# Patient Record
Sex: FEMALE | Race: BLACK OR AFRICAN AMERICAN | NOT HISPANIC OR LATINO | URBAN - METROPOLITAN AREA
[De-identification: names, ages, dates, MRNs, and addresses within clinical notes are randomized per-mention and may not be internally consistent; named-entity substitution may affect disease eponyms.]

---

## 2019-08-11 ENCOUNTER — EMERGENCY (EMERGENCY)
Facility: HOSPITAL | Age: 29
LOS: 1 days | Discharge: HOME | End: 2019-08-11
Attending: EMERGENCY MEDICINE | Admitting: EMERGENCY MEDICINE
Payer: COMMERCIAL

## 2019-08-11 VITALS
SYSTOLIC BLOOD PRESSURE: 116 MMHG | RESPIRATION RATE: 15 BRPM | HEART RATE: 70 BPM | OXYGEN SATURATION: 100 % | DIASTOLIC BLOOD PRESSURE: 74 MMHG | TEMPERATURE: 99 F

## 2019-08-11 VITALS
OXYGEN SATURATION: 100 % | DIASTOLIC BLOOD PRESSURE: 85 MMHG | HEART RATE: 76 BPM | SYSTOLIC BLOOD PRESSURE: 108 MMHG | RESPIRATION RATE: 15 BRPM | TEMPERATURE: 98 F

## 2019-08-11 LAB
ALBUMIN SERPL ELPH-MCNC: 4.1 G/DL — SIGNIFICANT CHANGE UP (ref 3.3–5)
ALP SERPL-CCNC: 83 U/L — SIGNIFICANT CHANGE UP (ref 40–120)
ALT FLD-CCNC: 11 U/L — SIGNIFICANT CHANGE UP (ref 4–33)
ANION GAP SERPL CALC-SCNC: 11 MMO/L — SIGNIFICANT CHANGE UP (ref 7–14)
APPEARANCE UR: CLEAR — SIGNIFICANT CHANGE UP
AST SERPL-CCNC: 16 U/L — SIGNIFICANT CHANGE UP (ref 4–32)
BASE EXCESS BLDV CALC-SCNC: 0.7 MMOL/L — SIGNIFICANT CHANGE UP
BASOPHILS # BLD AUTO: 0.04 K/UL — SIGNIFICANT CHANGE UP (ref 0–0.2)
BASOPHILS NFR BLD AUTO: 0.4 % — SIGNIFICANT CHANGE UP (ref 0–2)
BILIRUB SERPL-MCNC: 0.4 MG/DL — SIGNIFICANT CHANGE UP (ref 0.2–1.2)
BILIRUB UR-MCNC: NEGATIVE — SIGNIFICANT CHANGE UP
BLOOD GAS VENOUS - CREATININE: 0.84 MG/DL — SIGNIFICANT CHANGE UP (ref 0.5–1.3)
BLOOD GAS VENOUS - FIO2: 21 — SIGNIFICANT CHANGE UP
BLOOD UR QL VISUAL: SIGNIFICANT CHANGE UP
BUN SERPL-MCNC: 13 MG/DL — SIGNIFICANT CHANGE UP (ref 7–23)
CALCIUM SERPL-MCNC: 9.4 MG/DL — SIGNIFICANT CHANGE UP (ref 8.4–10.5)
CHLORIDE BLDV-SCNC: 108 MMOL/L — SIGNIFICANT CHANGE UP (ref 96–108)
CHLORIDE SERPL-SCNC: 104 MMOL/L — SIGNIFICANT CHANGE UP (ref 98–107)
CO2 SERPL-SCNC: 23 MMOL/L — SIGNIFICANT CHANGE UP (ref 22–31)
COLOR SPEC: SIGNIFICANT CHANGE UP
CREAT SERPL-MCNC: 0.84 MG/DL — SIGNIFICANT CHANGE UP (ref 0.5–1.3)
EOSINOPHIL # BLD AUTO: 0.33 K/UL — SIGNIFICANT CHANGE UP (ref 0–0.5)
EOSINOPHIL NFR BLD AUTO: 3.5 % — SIGNIFICANT CHANGE UP (ref 0–6)
GAS PNL BLDV: 134 MMOL/L — LOW (ref 136–146)
GLUCOSE BLDV-MCNC: 82 MG/DL — SIGNIFICANT CHANGE UP (ref 70–99)
GLUCOSE SERPL-MCNC: 84 MG/DL — SIGNIFICANT CHANGE UP (ref 70–99)
GLUCOSE UR-MCNC: NEGATIVE — SIGNIFICANT CHANGE UP
HCO3 BLDV-SCNC: 24 MMOL/L — SIGNIFICANT CHANGE UP (ref 20–27)
HCT VFR BLD CALC: 37.8 % — SIGNIFICANT CHANGE UP (ref 34.5–45)
HCT VFR BLDV CALC: 39.1 % — SIGNIFICANT CHANGE UP (ref 34.5–45)
HGB BLD-MCNC: 11.9 G/DL — SIGNIFICANT CHANGE UP (ref 11.5–15.5)
HGB BLDV-MCNC: 12.7 G/DL — SIGNIFICANT CHANGE UP (ref 11.5–15.5)
IMM GRANULOCYTES NFR BLD AUTO: 0.3 % — SIGNIFICANT CHANGE UP (ref 0–1.5)
KETONES UR-MCNC: NEGATIVE — SIGNIFICANT CHANGE UP
LACTATE BLDV-MCNC: 1 MMOL/L — SIGNIFICANT CHANGE UP (ref 0.5–2)
LEUKOCYTE ESTERASE UR-ACNC: NEGATIVE — SIGNIFICANT CHANGE UP
LYMPHOCYTES # BLD AUTO: 2.47 K/UL — SIGNIFICANT CHANGE UP (ref 1–3.3)
LYMPHOCYTES # BLD AUTO: 25.9 % — SIGNIFICANT CHANGE UP (ref 13–44)
MCHC RBC-ENTMCNC: 28.3 PG — SIGNIFICANT CHANGE UP (ref 27–34)
MCHC RBC-ENTMCNC: 31.5 % — LOW (ref 32–36)
MCV RBC AUTO: 90 FL — SIGNIFICANT CHANGE UP (ref 80–100)
MONOCYTES # BLD AUTO: 0.74 K/UL — SIGNIFICANT CHANGE UP (ref 0–0.9)
MONOCYTES NFR BLD AUTO: 7.7 % — SIGNIFICANT CHANGE UP (ref 2–14)
NEUTROPHILS # BLD AUTO: 5.94 K/UL — SIGNIFICANT CHANGE UP (ref 1.8–7.4)
NEUTROPHILS NFR BLD AUTO: 62.2 % — SIGNIFICANT CHANGE UP (ref 43–77)
NITRITE UR-MCNC: NEGATIVE — SIGNIFICANT CHANGE UP
NRBC # FLD: 0 K/UL — SIGNIFICANT CHANGE UP (ref 0–0)
PCO2 BLDV: 49 MMHG — SIGNIFICANT CHANGE UP (ref 41–51)
PH BLDV: 7.34 PH — SIGNIFICANT CHANGE UP (ref 7.32–7.43)
PH UR: 6 — SIGNIFICANT CHANGE UP (ref 5–8)
PLATELET # BLD AUTO: 265 K/UL — SIGNIFICANT CHANGE UP (ref 150–400)
PMV BLD: 10.4 FL — SIGNIFICANT CHANGE UP (ref 7–13)
PO2 BLDV: 41 MMHG — HIGH (ref 35–40)
POTASSIUM BLDV-SCNC: 3.8 MMOL/L — SIGNIFICANT CHANGE UP (ref 3.4–4.5)
POTASSIUM SERPL-MCNC: 4.1 MMOL/L — SIGNIFICANT CHANGE UP (ref 3.5–5.3)
POTASSIUM SERPL-SCNC: 4.1 MMOL/L — SIGNIFICANT CHANGE UP (ref 3.5–5.3)
PROT SERPL-MCNC: 7 G/DL — SIGNIFICANT CHANGE UP (ref 6–8.3)
PROT UR-MCNC: NEGATIVE — SIGNIFICANT CHANGE UP
RBC # BLD: 4.2 M/UL — SIGNIFICANT CHANGE UP (ref 3.8–5.2)
RBC # FLD: 12.7 % — SIGNIFICANT CHANGE UP (ref 10.3–14.5)
RBC CASTS # UR COMP ASSIST: SIGNIFICANT CHANGE UP (ref 0–?)
SAO2 % BLDV: 67.5 % — SIGNIFICANT CHANGE UP (ref 60–85)
SODIUM SERPL-SCNC: 138 MMOL/L — SIGNIFICANT CHANGE UP (ref 135–145)
SP GR SPEC: 1.02 — SIGNIFICANT CHANGE UP (ref 1–1.04)
UROBILINOGEN FLD QL: NORMAL — SIGNIFICANT CHANGE UP
WBC # BLD: 9.55 K/UL — SIGNIFICANT CHANGE UP (ref 3.8–10.5)
WBC # FLD AUTO: 9.55 K/UL — SIGNIFICANT CHANGE UP (ref 3.8–10.5)
WBC UR QL: SIGNIFICANT CHANGE UP (ref 0–?)

## 2019-08-11 PROCEDURE — 74177 CT ABD & PELVIS W/CONTRAST: CPT | Mod: 26

## 2019-08-11 PROCEDURE — 99284 EMERGENCY DEPT VISIT MOD MDM: CPT

## 2019-08-11 PROCEDURE — 76830 TRANSVAGINAL US NON-OB: CPT | Mod: 26

## 2019-08-11 RX ORDER — SODIUM CHLORIDE 9 MG/ML
1000 INJECTION, SOLUTION INTRAVENOUS ONCE
Refills: 0 | Status: COMPLETED | OUTPATIENT
Start: 2019-08-11 | End: 2019-08-11

## 2019-08-11 RX ORDER — ACETAMINOPHEN 500 MG
975 TABLET ORAL ONCE
Refills: 0 | Status: COMPLETED | OUTPATIENT
Start: 2019-08-11 | End: 2019-08-11

## 2019-08-11 RX ADMIN — SODIUM CHLORIDE 1000 MILLILITER(S): 9 INJECTION, SOLUTION INTRAVENOUS at 05:25

## 2019-08-11 RX ADMIN — Medication 975 MILLIGRAM(S): at 05:25

## 2019-08-11 NOTE — ED PROVIDER NOTE - NSFOLLOWUPINSTRUCTIONS_ED_ALL_ED_FT
Follow up with your primary care doctor and gynecologist   Return to ED for any new or worsening symptoms   For pain can take tylenol 500mg every 6 hours or motrin 600mg every 6 hours as needed

## 2019-08-11 NOTE — ED PROVIDER NOTE - OBJECTIVE STATEMENT
Dr Hernandez  29yo F no sig pmhx pw from home two main complaints. 1. lower abdominal pain x 2 weeks. Intermittent lower abdominal pain, started on right now radiating to left. no associated fever, nausea, vomit or diarrhea. no vag discharge or bleeding. denies preg.  2. 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 pain with heavy lifting. pain worsens during the day. has not taken any pain med.

## 2019-08-11 NOTE — ED PROVIDER NOTE - PROGRESS NOTE DETAILS
patient noted resting comfortably. States pain improved. Discussed findings of ovarian cyst and uterine fibroid. States she has a GYN to follow up with.

## 2019-08-11 NOTE — ED ADULT TRIAGE NOTE - CHIEF COMPLAINT QUOTE
alert no distress c/o RLQ abd pain no N/V/D  started 2 weeks ago  and jerrell elbow pain started 2 weeks ago   denies med hx

## 2019-08-11 NOTE — ED PROVIDER NOTE - NSFOLLOWUPCLINICS_GEN_ALL_ED_FT
[facility]  OB/GYN & Surg  [phone] [place] [address]  Phone: [phone]  Fax:   Follow Up Time: Routine

## 2019-08-11 NOTE — ED ADULT NURSE NOTE - OBJECTIVE STATEMENT
pt present to ed with compleints of muscle strain in right and left ileo scral region and pain in both forearms and pain in left breast after lifting 50 pound bottles of water at work

## 2020-04-29 PROBLEM — Z00.00 ENCOUNTER FOR PREVENTIVE HEALTH EXAMINATION: Status: ACTIVE | Noted: 2020-04-29

## 2020-05-20 ENCOUNTER — APPOINTMENT (OUTPATIENT)
Dept: ORTHOPEDIC SURGERY | Facility: CLINIC | Age: 30
End: 2020-05-20
Payer: OTHER MISCELLANEOUS

## 2020-05-20 ENCOUNTER — FORM ENCOUNTER (OUTPATIENT)
Age: 30
End: 2020-05-20

## 2020-05-20 VITALS
BODY MASS INDEX: 28.32 KG/M2 | HEART RATE: 82 BPM | WEIGHT: 170 LBS | SYSTOLIC BLOOD PRESSURE: 113 MMHG | DIASTOLIC BLOOD PRESSURE: 75 MMHG | HEIGHT: 65 IN

## 2020-05-20 VITALS — TEMPERATURE: 98.7 F

## 2020-05-20 DIAGNOSIS — Z78.9 OTHER SPECIFIED HEALTH STATUS: ICD-10-CM

## 2020-05-20 PROCEDURE — 72100 X-RAY EXAM L-S SPINE 2/3 VWS: CPT

## 2020-05-20 PROCEDURE — 72040 X-RAY EXAM NECK SPINE 2-3 VW: CPT

## 2020-05-20 PROCEDURE — 99204 OFFICE O/P NEW MOD 45 MIN: CPT

## 2020-05-20 PROCEDURE — 72070 X-RAY EXAM THORAC SPINE 2VWS: CPT

## 2020-06-21 ENCOUNTER — APPOINTMENT (OUTPATIENT)
Dept: MRI IMAGING | Facility: CLINIC | Age: 30
End: 2020-06-21

## 2020-08-13 ENCOUNTER — FORM ENCOUNTER (OUTPATIENT)
Age: 30
End: 2020-08-13

## 2020-08-28 ENCOUNTER — APPOINTMENT (OUTPATIENT)
Dept: ORTHOPEDIC SURGERY | Facility: CLINIC | Age: 30
End: 2020-08-28
Payer: OTHER MISCELLANEOUS

## 2020-08-28 VITALS — TEMPERATURE: 98.4 F

## 2020-08-28 PROCEDURE — 99214 OFFICE O/P EST MOD 30 MIN: CPT

## 2020-09-01 ENCOUNTER — FORM ENCOUNTER (OUTPATIENT)
Age: 30
End: 2020-09-01

## 2020-09-02 ENCOUNTER — FORM ENCOUNTER (OUTPATIENT)
Age: 30
End: 2020-09-02

## 2020-11-28 ENCOUNTER — EMERGENCY (EMERGENCY)
Facility: HOSPITAL | Age: 30
LOS: 0 days | Discharge: HOME | End: 2020-11-28
Attending: EMERGENCY MEDICINE
Payer: COMMERCIAL

## 2020-11-28 VITALS
SYSTOLIC BLOOD PRESSURE: 146 MMHG | HEART RATE: 74 BPM | RESPIRATION RATE: 17 BRPM | OXYGEN SATURATION: 99 % | TEMPERATURE: 98 F | DIASTOLIC BLOOD PRESSURE: 84 MMHG

## 2020-11-28 VITALS — WEIGHT: 259.93 LBS | HEIGHT: 65 IN

## 2020-11-28 DIAGNOSIS — R50.9 FEVER, UNSPECIFIED: ICD-10-CM

## 2020-11-28 DIAGNOSIS — R51.9 HEADACHE, UNSPECIFIED: ICD-10-CM

## 2020-11-28 DIAGNOSIS — U07.1 COVID-19: ICD-10-CM

## 2020-11-28 DIAGNOSIS — Z91.013 ALLERGY TO SEAFOOD: ICD-10-CM

## 2020-11-28 DIAGNOSIS — R43.9 UNSPECIFIED DISTURBANCES OF SMELL AND TASTE: ICD-10-CM

## 2020-11-28 PROCEDURE — 99283 EMERGENCY DEPT VISIT LOW MDM: CPT

## 2020-11-28 NOTE — ED ADULT NURSE NOTE - NSIMPLEMENTINTERV_GEN_ALL_ED
Implemented All Universal Safety Interventions:  [place] to call system. Call bell, personal items and telephone within reach. Instruct patient to call for assistance. Room bathroom lighting operational. Non-slip footwear when patient is off stretcher. Physically safe environment: no spills, clutter or unnecessary equipment. Stretcher in lowest position, wheels locked, appropriate side rails in place.

## 2020-11-28 NOTE — ED PROVIDER NOTE - CLINICAL SUMMARY MEDICAL DECISION MAKING FREE TEXT BOX
pt without significant sob or symptoms that may represent COVID related issues that may require hospitalization. discussed with pt supportive care and  will dc with time off work and PMD follow up as necessary or revisit ED if pt needs care for sob.

## 2020-11-28 NOTE — ED PROVIDER NOTE - OBJECTIVE STATEMENT
29 y/o no pertinent PMHx presents with positive COVID test as of Thursday. pt complaining of headache, loss of taste, and fever. denies any significant cp or sob. pt states she works in the [employer] and may have contracted it at work, after a coworker tested positive for COVID. Otherwise  has tested negative for COVID.

## 2020-11-28 NOTE — ED ADULT TRIAGE NOTE - CHIEF COMPLAINT QUOTE
patient states she was tested positive for COVID on Thursday morning and now having headache , loss of taste and smell and having fever .

## 2020-11-28 NOTE — ED PROVIDER NOTE - PATIENT PORTAL LINK FT
You can access the FollowMyHealth Patient Portal offered by [facility] by registering at the following website: [email or web address]. By joining [employer]’s FollowMyHealth portal, you will also be able to view your health information using other applications (apps) compatible with our system.

## 2020-11-29 ENCOUNTER — TRANSCRIPTION ENCOUNTER (OUTPATIENT)
Age: 30
End: 2020-11-29

## 2020-12-14 ENCOUNTER — EMERGENCY (EMERGENCY)
Facility: HOSPITAL | Age: 30
LOS: 0 days | Discharge: HOME | End: 2020-12-14
Attending: EMERGENCY MEDICINE
Payer: COMMERCIAL

## 2020-12-14 VITALS
HEART RATE: 78 BPM | DIASTOLIC BLOOD PRESSURE: 80 MMHG | HEIGHT: 65 IN | TEMPERATURE: 98 F | OXYGEN SATURATION: 100 % | SYSTOLIC BLOOD PRESSURE: 138 MMHG | RESPIRATION RATE: 18 BRPM | WEIGHT: 199.96 LBS

## 2020-12-14 VITALS — DIASTOLIC BLOOD PRESSURE: 77 MMHG | HEART RATE: 80 BPM | SYSTOLIC BLOOD PRESSURE: 142 MMHG | TEMPERATURE: 99 F

## 2020-12-14 DIAGNOSIS — R06.02 SHORTNESS OF BREATH: ICD-10-CM

## 2020-12-14 DIAGNOSIS — R07.9 CHEST PAIN, UNSPECIFIED: ICD-10-CM

## 2020-12-14 DIAGNOSIS — J06.9 ACUTE UPPER RESPIRATORY INFECTION, UNSPECIFIED: ICD-10-CM

## 2020-12-14 DIAGNOSIS — Z91.013 ALLERGY TO SEAFOOD: ICD-10-CM

## 2020-12-14 DIAGNOSIS — R05 COUGH: ICD-10-CM

## 2020-12-14 LAB
ALBUMIN SERPL ELPH-MCNC: 3.5 G/DL — SIGNIFICANT CHANGE UP (ref 3.3–5)
ALP SERPL-CCNC: 89 U/L — SIGNIFICANT CHANGE UP (ref 40–120)
ALT FLD-CCNC: 30 U/L — SIGNIFICANT CHANGE UP (ref 12–78)
ANION GAP SERPL CALC-SCNC: 5 MMOL/L — SIGNIFICANT CHANGE UP (ref 5–17)
APTT BLD: 34.3 SEC — SIGNIFICANT CHANGE UP (ref 27.5–35.5)
AST SERPL-CCNC: 15 U/L — SIGNIFICANT CHANGE UP (ref 15–37)
BASOPHILS # BLD AUTO: 0.05 K/UL — SIGNIFICANT CHANGE UP (ref 0–0.2)
BASOPHILS NFR BLD AUTO: 0.5 % — SIGNIFICANT CHANGE UP (ref 0–2)
BILIRUB SERPL-MCNC: 0.4 MG/DL — SIGNIFICANT CHANGE UP (ref 0.2–1.2)
BUN SERPL-MCNC: 5 MG/DL — LOW (ref 7–23)
CALCIUM SERPL-MCNC: 8.4 MG/DL — LOW (ref 8.5–10.1)
CHLORIDE SERPL-SCNC: 106 MMOL/L — SIGNIFICANT CHANGE UP (ref 96–108)
CO2 SERPL-SCNC: 28 MMOL/L — SIGNIFICANT CHANGE UP (ref 22–31)
CREAT SERPL-MCNC: 0.84 MG/DL — SIGNIFICANT CHANGE UP (ref 0.5–1.3)
D DIMER BLD IA.RAPID-MCNC: <150 NG/ML DDU — SIGNIFICANT CHANGE UP
EOSINOPHIL # BLD AUTO: 0.55 K/UL — HIGH (ref 0–0.5)
EOSINOPHIL NFR BLD AUTO: 6 % — SIGNIFICANT CHANGE UP (ref 0–6)
GLUCOSE SERPL-MCNC: 76 MG/DL — SIGNIFICANT CHANGE UP (ref 70–99)
HCG SERPL-ACNC: <1 MIU/ML — SIGNIFICANT CHANGE UP
HCT VFR BLD CALC: 37.3 % — SIGNIFICANT CHANGE UP (ref 34.5–45)
HGB BLD-MCNC: 12.3 G/DL — SIGNIFICANT CHANGE UP (ref 11.5–15.5)
IMM GRANULOCYTES NFR BLD AUTO: 0.4 % — SIGNIFICANT CHANGE UP (ref 0–1.5)
INR BLD: 0.94 RATIO — SIGNIFICANT CHANGE UP (ref 0.88–1.16)
LYMPHOCYTES # BLD AUTO: 2.22 K/UL — SIGNIFICANT CHANGE UP (ref 1–3.3)
LYMPHOCYTES # BLD AUTO: 24.1 % — SIGNIFICANT CHANGE UP (ref 13–44)
MCHC RBC-ENTMCNC: 29.4 PG — SIGNIFICANT CHANGE UP (ref 27–34)
MCHC RBC-ENTMCNC: 33 GM/DL — SIGNIFICANT CHANGE UP (ref 32–36)
MCV RBC AUTO: 89 FL — SIGNIFICANT CHANGE UP (ref 80–100)
MONOCYTES # BLD AUTO: 0.62 K/UL — SIGNIFICANT CHANGE UP (ref 0–0.9)
MONOCYTES NFR BLD AUTO: 6.7 % — SIGNIFICANT CHANGE UP (ref 2–14)
NEUTROPHILS # BLD AUTO: 5.72 K/UL — SIGNIFICANT CHANGE UP (ref 1.8–7.4)
NEUTROPHILS NFR BLD AUTO: 62.3 % — SIGNIFICANT CHANGE UP (ref 43–77)
NRBC # BLD: 0 /100 WBCS — SIGNIFICANT CHANGE UP (ref 0–0)
PLATELET # BLD AUTO: 312 K/UL — SIGNIFICANT CHANGE UP (ref 150–400)
POTASSIUM SERPL-MCNC: 3.7 MMOL/L — SIGNIFICANT CHANGE UP (ref 3.5–5.3)
POTASSIUM SERPL-SCNC: 3.7 MMOL/L — SIGNIFICANT CHANGE UP (ref 3.5–5.3)
PROT SERPL-MCNC: 7.5 GM/DL — SIGNIFICANT CHANGE UP (ref 6–8.3)
PROTHROM AB SERPL-ACNC: 10.9 SEC — SIGNIFICANT CHANGE UP (ref 10.6–13.6)
RBC # BLD: 4.19 M/UL — SIGNIFICANT CHANGE UP (ref 3.8–5.2)
RBC # FLD: 12.9 % — SIGNIFICANT CHANGE UP (ref 10.3–14.5)
SODIUM SERPL-SCNC: 139 MMOL/L — SIGNIFICANT CHANGE UP (ref 135–145)
TROPONIN I SERPL-MCNC: <.015 NG/ML — SIGNIFICANT CHANGE UP (ref 0.01–0.04)
WBC # BLD: 9.2 K/UL — SIGNIFICANT CHANGE UP (ref 3.8–10.5)
WBC # FLD AUTO: 9.2 K/UL — SIGNIFICANT CHANGE UP (ref 3.8–10.5)

## 2020-12-14 PROCEDURE — 71045 X-RAY EXAM CHEST 1 VIEW: CPT | Mod: 26

## 2020-12-14 PROCEDURE — 99285 EMERGENCY DEPT VISIT HI MDM: CPT

## 2020-12-14 PROCEDURE — 93010 ELECTROCARDIOGRAM REPORT: CPT

## 2020-12-14 NOTE — ED PROVIDER NOTE - PATIENT PORTAL LINK FT
You can access the FollowMyHealth Patient Portal offered by [facility] by registering at the following website: [email or web address]. By joining [employer]’s FollowMyHealth portal, you will also be able to view your health information using other applications (apps) compatible with our system.

## 2020-12-14 NOTE — ED ADULT TRIAGE NOTE - CHIEF COMPLAINT QUOTE
flu like symptoms x 14 days rapid covid test done yesterday,  had Covid 2 weeks ago, c/o tightness of chest and sob on and off

## 2020-12-14 NOTE — ED PROVIDER NOTE - PROGRESS NOTE DETAILS
pt has been alert and oriented x 3 pox has been 100 % in room air. Pt is ambulating without dizziness, palpitations, sob, chest pain, nausea, vomiting, abd pain. Pt sts she just came off 14 days quarantine and she request more days off pt has been alert and oriented x 3 pox has been 100 % in room air. Pt is ambulating without dizziness, palpitations, sob, chest pain, nausea, vomiting, abd pain. Pt sts she just came off 14 days quarantine and she request more days off. Pt is given and explained all test reports. d-dimer negative, tropo negative cxr clear, wbd normal . Pt is advised to follow up with pmd and return if symptoms persist or worsen.

## 2020-12-14 NOTE — ED ADULT NURSE NOTE - NS PRO PASSIVE SMOKE EXP
Unknown Azithromycin Pregnancy And Lactation Text: This medication is considered safe during pregnancy and is also secreted in breast milk.

## 2020-12-14 NOTE — ED PROVIDER NOTE - OBJECTIVE STATEMENT
30 years old female walked in c/o cough, sob, chest pain, nasal congestions chills body aches for 14 days. Pt sts her  is tested + for covid. and she had rapid covid test unknown result. Pt did not take tylenol or motrin today. Pt denies headache, dizziness, blurred visions, light sensitivities, palpitations, pain/swelling sandoval the legs, focal/distal weakness or numbness, neck/back pain, nausea, vomiting, fever, chills, abd pain, dysuria, vaginal spotting or discharge or irregular bowel movements. 30 years old female walked in c/o cough, sob, chest pain, nasal congestions chills body aches for 14 days. Pt sts her  is tested + for covid. and she had rapid covid test negative result. Pt did not take tylenol or motrin today. Pt denies headache, dizziness, blurred visions, light sensitivities, palpitations, pain/swelling sandoval the legs, focal/distal weakness or numbness, neck/back pain, nausea, vomiting, fever, chills, abd pain, dysuria, vaginal spotting or discharge or irregular bowel movements.

## 2020-12-15 LAB — SARS-COV-2 RNA SPEC QL NAA+PROBE: SIGNIFICANT CHANGE UP

## 2020-12-20 ENCOUNTER — EMERGENCY (EMERGENCY)
Facility: HOSPITAL | Age: 30
LOS: 0 days | Discharge: HOME | End: 2020-12-20
Attending: EMERGENCY MEDICINE
Payer: COMMERCIAL

## 2020-12-20 VITALS
OXYGEN SATURATION: 100 % | WEIGHT: 199.96 LBS | HEIGHT: 65 IN | TEMPERATURE: 98 F | RESPIRATION RATE: 16 BRPM | SYSTOLIC BLOOD PRESSURE: 118 MMHG | DIASTOLIC BLOOD PRESSURE: 69 MMHG | HEART RATE: 91 BPM

## 2020-12-20 VITALS
HEART RATE: 93 BPM | OXYGEN SATURATION: 97 % | RESPIRATION RATE: 18 BRPM | DIASTOLIC BLOOD PRESSURE: 74 MMHG | SYSTOLIC BLOOD PRESSURE: 132 MMHG | TEMPERATURE: 98 F

## 2020-12-20 DIAGNOSIS — U07.1 COVID-19: ICD-10-CM

## 2020-12-20 DIAGNOSIS — Z02.89 ENCOUNTER FOR OTHER ADMINISTRATIVE EXAMINATIONS: ICD-10-CM

## 2020-12-20 PROCEDURE — 99283 EMERGENCY DEPT VISIT LOW MDM: CPT

## 2020-12-20 RX ORDER — SODIUM CHLORIDE 9 MG/ML
1000 INJECTION INTRAMUSCULAR; INTRAVENOUS; SUBCUTANEOUS ONCE
Refills: 0 | Status: DISCONTINUED | OUTPATIENT
Start: 2020-12-20 | End: 2020-12-20

## 2020-12-20 RX ORDER — ACETAMINOPHEN 500 MG
975 TABLET ORAL ONCE
Refills: 0 | Status: DISCONTINUED | OUTPATIENT
Start: 2020-12-20 | End: 2020-12-20

## 2020-12-20 NOTE — ED PROVIDER NOTE - PATIENT PORTAL LINK FT
You can access the FollowMyHealth Patient Portal offered by [facility] by registering at the following website: [email or web address]. By joining [employer]’s FollowMyHealth portal, you will also be able to view your health information using other applications (apps) compatible with our system.

## 2020-12-20 NOTE — ED PROVIDER NOTE - OBJECTIVE STATEMENT
31 yo F with recent covid infection, presents to ER to have paperwork done for her job.  Pt. is an [employer] worker and specific form needs to be completed reflecting time off, her job would not accept a simple doctors note from this ER.  In summary, Pt. initially tested positive 11/26/20, had symptoms for most of the last three weeks.  Symptoms are improving as now she has residual congestion and headaches at times, but no fever.  Other symptoms have resolved.  She feels much better.  ROS: negative for fever, cough, chest pain, shortness of breath, abd pain, nausea, vomiting, diarrhea, rash, paresthesia, and weakness--all other systems reviewed are negative.   PMH: negative; Meds: Denies; SH: Denies smoking/drinking/drug use

## 2020-12-20 NOTE — ED ADULT NURSE NOTE - NSIMPLEMENTINTERV_GEN_ALL_ED
Implemented All Universal Safety Interventions:  [place] to call system. Call bell, personal items and telephone within reach. Instruct patient to call for assistance. Room bathroom lighting operational. Non-slip footwear when patient is off stretcher. Physically safe environment: no spills, clutter or unnecessary equipment. Stretcher in lowest position, wheels locked, appropriate side rails in place.

## 2020-12-20 NOTE — ED PROVIDER NOTE - CLINICAL SUMMARY MEDICAL DECISION MAKING FREE TEXT BOX
29 yo F s/p covid, requests [employer] leave of absence application to be filled out to signify her time off, as reflected in our EMR, pt. requests a few extra days of rest to get over the headaches and chills--will oblige  -note signed from 11/26 to 12/26 to signify time off  -d/c with pcp f/u

## 2020-12-21 PROBLEM — Z78.9 OTHER SPECIFIED HEALTH STATUS: Chronic | Status: ACTIVE | Noted: 2020-12-14

## 2021-01-11 NOTE — ED ADULT NURSE NOTE - OBJECTIVE STATEMENT
no concerns Pt complains of nasal congestion, headache, cough, lack of taste and smell. Pt refuses pain meds, states she will take meds when she gets home

## 2021-02-07 ENCOUNTER — FORM ENCOUNTER (OUTPATIENT)
Age: 31
End: 2021-02-07

## 2021-04-19 ENCOUNTER — APPOINTMENT (OUTPATIENT)
Dept: ORTHOPEDIC SURGERY | Facility: CLINIC | Age: 31
End: 2021-04-19
Payer: OTHER MISCELLANEOUS

## 2021-04-19 PROCEDURE — 99214 OFFICE O/P EST MOD 30 MIN: CPT

## 2021-04-19 PROCEDURE — 99072 ADDL SUPL MATRL&STAF TM PHE: CPT

## 2021-04-19 PROCEDURE — 72100 X-RAY EXAM L-S SPINE 2/3 VWS: CPT

## 2021-04-19 PROCEDURE — 72040 X-RAY EXAM NECK SPINE 2-3 VW: CPT

## 2021-06-09 ENCOUNTER — EMERGENCY (EMERGENCY)
Facility: HOSPITAL | Age: 31
LOS: 0 days | Discharge: HOME | End: 2021-06-09
Attending: STUDENT IN AN ORGANIZED HEALTH CARE EDUCATION/TRAINING PROGRAM
Payer: COMMERCIAL

## 2021-06-09 VITALS
HEART RATE: 95 BPM | TEMPERATURE: 97 F | SYSTOLIC BLOOD PRESSURE: 109 MMHG | RESPIRATION RATE: 16 BRPM | DIASTOLIC BLOOD PRESSURE: 76 MMHG | OXYGEN SATURATION: 100 %

## 2021-06-09 VITALS
HEIGHT: 65 IN | RESPIRATION RATE: 17 BRPM | OXYGEN SATURATION: 99 % | WEIGHT: 169.98 LBS | DIASTOLIC BLOOD PRESSURE: 59 MMHG | SYSTOLIC BLOOD PRESSURE: 123 MMHG | TEMPERATURE: 98 F | HEART RATE: 74 BPM

## 2021-06-09 DIAGNOSIS — Z98.890 OTHER SPECIFIED POSTPROCEDURAL STATES: Chronic | ICD-10-CM

## 2021-06-09 DIAGNOSIS — R60.0 LOCALIZED EDEMA: ICD-10-CM

## 2021-06-09 DIAGNOSIS — M54.31 SCIATICA, RIGHT SIDE: ICD-10-CM

## 2021-06-09 DIAGNOSIS — M79.662 PAIN IN LEFT LOWER LEG: ICD-10-CM
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
## 2021-06-09 DIAGNOSIS — Z91.013 ALLERGY TO SEAFOOD: ICD-10-CM
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
## 2021-06-09 DIAGNOSIS — M50.20 OTHER CERVICAL DISC DISPLACEMENT, UNSPECIFIED CERVICAL REGION: Chronic | ICD-10-CM

## 2021-06-09 DIAGNOSIS — M50.20 OTHER CERVICAL DISC DISPLACEMENT, UNSPECIFIED CERVICAL REGION: ICD-10-CM
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
## 2021-06-09 PROCEDURE — 93970 EXTREMITY STUDY: CPT | Mod: 26

## 2021-06-09 PROCEDURE — 99284 EMERGENCY DEPT VISIT MOD MDM: CPT

## 2021-06-09 RX ORDER — ACETAMINOPHEN 500 MG
0 TABLET ORAL
Qty: 0 | Refills: 0 | DISCHARGE

## 2021-06-09 NOTE — ED ADULT NURSE NOTE - OBJECTIVE STATEMENT
30 yea r old female c/o pain swelling and numbness to bi-lateral LE with pain since 6/7/2021. Patient states a pint of paint fell on right foot but has been experiencing pain to bilateral LE. NO PMH, SH: herniated disc, siatica,

## 2021-06-09 NOTE — ED ADULT TRIAGE NOTE - CHIEF COMPLAINT QUOTE
pt presents to the ED with c/o bilateral foot numbness and swelling and pain, patient is a , who admits to driving long distances

## 2021-06-09 NOTE — ED ADULT NURSE NOTE - PMH
ACL (anterior cruciate ligament) tear    Herniated disc, cervical    No pertinent past medical history    Sciatica of right side

## 2021-06-09 NOTE — ED ADULT NURSE NOTE - CAS TRG GENERAL AIRWAY, MLM
Take Bentyl for abdominal pain and Zofran for nausea.   Follow up with primary care in 2 days and return to ER for worsening symptoms oras needed.    
Patent

## 2021-06-09 NOTE — ED PROVIDER NOTE - OBJECTIVE STATEMENT
30 year old female with no significant pmhx presents with c/o swelling to lower legs since Monday. pt states she is on her feet for 8+ hours a day for work. she reports prior to today a gallon of paint fell on her left ankle. she states she tried a rice soak at home as well as bengay with no relief. she denies any lengthy travel, ocp use, sob, or cp. 30 year old female with no significant pmhx presents with c/o swelling to lower legs and calf pain since Monday. pt states she is on her feet for 8+ hours a day for work. she reports prior to today a gallon of paint fell on her left ankle. she states she tried a rice soak at home as well as bengay with no relief. she denies any lengthy travel, ocp use, sob, or cp.

## 2021-06-09 NOTE — ED PROVIDER NOTE - CLINICAL SUMMARY MEDICAL DECISION MAKING FREE TEXT BOX
pt presented with bilateral leg edema on monday, improved today, notes calf pains, sono is negative, pt told to elevate, tylenol or motrin for pain, follow up with her pmd

## 2021-06-09 NOTE — ED PROVIDER NOTE - PATIENT PORTAL LINK FT
You can access the FollowMyHealth Patient Portal offered by [facility] by registering at the following website: [email or web address]. By joining [employer]’s FollowMyHealth portal, you will also be able to view your health information using other applications (apps) compatible with our system.

## 2021-09-17 ENCOUNTER — EMERGENCY (EMERGENCY)
Facility: HOSPITAL | Age: 31
LOS: 0 days | Discharge: HOME | End: 2021-09-17
Attending: STUDENT IN AN ORGANIZED HEALTH CARE EDUCATION/TRAINING PROGRAM
Payer: OTHER MISCELLANEOUS

## 2021-09-17 VITALS
DIASTOLIC BLOOD PRESSURE: 89 MMHG | HEIGHT: 64 IN | SYSTOLIC BLOOD PRESSURE: 131 MMHG | TEMPERATURE: 99 F | WEIGHT: 225.09 LBS | OXYGEN SATURATION: 100 % | HEART RATE: 83 BPM | RESPIRATION RATE: 18 BRPM

## 2021-09-17 DIAGNOSIS — Z91.013 ALLERGY TO SEAFOOD: ICD-10-CM

## 2021-09-17 DIAGNOSIS — Z98.890 OTHER SPECIFIED POSTPROCEDURAL STATES: Chronic | ICD-10-CM
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
## 2021-09-17 DIAGNOSIS — M50.20 OTHER CERVICAL DISC DISPLACEMENT, UNSPECIFIED CERVICAL REGION: Chronic | ICD-10-CM

## 2021-09-17 DIAGNOSIS — M25.511 PAIN IN RIGHT SHOULDER: ICD-10-CM

## 2021-09-17 DIAGNOSIS — M25.561 PAIN IN RIGHT KNEE: ICD-10-CM

## 2021-09-17 DIAGNOSIS — Z87.39 PERSONAL HISTORY OF OTHER DISEASES OF THE MUSCULOSKELETAL SYSTEM AND CONNECTIVE TISSUE: ICD-10-CM

## 2021-09-17 DIAGNOSIS — M79.641 PAIN IN RIGHT HAND: ICD-10-CM

## 2021-09-17 LAB — HCG UR QL: NEGATIVE — SIGNIFICANT CHANGE UP

## 2021-09-17 PROCEDURE — 73502 X-RAY EXAM HIP UNI 2-3 VIEWS: CPT | Mod: 26,RT

## 2021-09-17 PROCEDURE — 99284 EMERGENCY DEPT VISIT MOD MDM: CPT

## 2021-09-17 PROCEDURE — 73562 X-RAY EXAM OF KNEE 3: CPT | Mod: 26,RT

## 2021-09-17 PROCEDURE — 73130 X-RAY EXAM OF HAND: CPT | Mod: 26,RT

## 2021-09-17 RX ORDER — IBUPROFEN 200 MG
400 TABLET ORAL ONCE
Refills: 0 | Status: COMPLETED | OUTPATIENT
Start: 2021-09-17 | End: 2021-09-17

## 2021-09-17 RX ORDER — ACETAMINOPHEN 500 MG
975 TABLET ORAL ONCE
Refills: 0 | Status: COMPLETED | OUTPATIENT
Start: 2021-09-17 | End: 2021-09-17

## 2021-09-17 RX ORDER — LIDOCAINE 4 G/100G
1 CREAM TOPICAL ONCE
Refills: 0 | Status: COMPLETED | OUTPATIENT
Start: 2021-09-17 | End: 2021-09-17

## 2021-09-17 RX ADMIN — Medication 975 MILLIGRAM(S): at 21:07

## 2021-09-17 RX ADMIN — Medication 400 MILLIGRAM(S): at 21:06

## 2021-09-17 RX ADMIN — LIDOCAINE 1 PATCH: 4 CREAM TOPICAL at 21:08

## 2021-09-17 NOTE — ED PROVIDER NOTE - NS ED ROS FT
CONST: no fevers, no chills, no trauma  EYES: no pain, no visual disturbances  ENT: no sore throat, no epistaxis, no rhinorrhea, no hearing changes  CV: no chest pain, no palpitations, no orthopnea, no extremity pain or swelling  RESP: no shortness of breath, no cough, no sputum, no pleurisy, no wheezing  ABD: no abdominal pain, no nausea, no vomiting, no diarrhea, no black or bloody stool  : no dysuria, no hematuria, no frequency, no urgency  MSK: no back pain, + neck pain, + extremity pain  NEURO: no headache, no sensory disturbances, no focal weakness, no dizziness  HEME: no easy bleeding or bruising  SKIN: no diaphoresis, no rash

## 2021-09-17 NOTE — ED ADULT NURSE NOTE - OBJECTIVE STATEMENT
pt a&o x4 ambulatory pt states fell off of a truck about 1 foot onto the ground landing on right side. pt c.o of  right sided neck pain s/p fall of truck missing one step landing on right arm and right knee and r  hip. pt states injury today 1400. no pmh. LMP

## 2021-09-17 NOTE — ED ADULT NURSE NOTE - NSICDXPASTMEDICALHX_GEN_ALL_CORE_FT
PAST MEDICAL HISTORY:  ACL (anterior cruciate ligament) tear     Herniated disc, cervical     No pertinent past medical history     Sciatica of right side
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
## 2021-09-17 NOTE — ED PROVIDER NOTE - NSFOLLOWUPINSTRUCTIONS_ED_ALL_ED_FT
Take Tylenol 650mg (Two 325 mg pills) every 4-6 hours as needed for pain.  Take Motrin 600 mg every 8 hours as needed for moderate pain -- take with food.  A lidocaine patch was placed, remove after 12 hours. Do not use for more than 12 hours in 24 hour period. You may purchase more over the counter at your local pharmacy.    Back Pain    Back pain is very common in adults. The cause of back pain is rarely dangerous and the pain often gets better over time. The cause of your back pain may not be known and may include strain of muscles or ligaments, degeneration of the spinal disks, or arthritis. Occasionally the pain may radiate down your leg(s). Over-the-counter medicines to reduce pain and inflammation are often the most helpful. Stretching and remaining active frequently helps the healing process.     SEEK IMMEDIATE MEDICAL CARE IF YOU HAVE ANY OF THE FOLLOWING SYMPTOMS: bowel or bladder control problems, unusual weakness or numbness in your arms or legs, nausea or vomiting, abdominal pain, fever, dizziness/lightheadedness.

## 2021-09-17 NOTE — ED PROVIDER NOTE - PATIENT PORTAL LINK FT
You can access the FollowMyHealth Patient Portal offered by [facility] by registering at the following website: [email or web address]. By joining [employer]’s FollowMyHealth portal, you will also be able to view your health information using other applications (apps) compatible with our system.

## 2021-09-17 NOTE — ED ADULT TRIAGE NOTE - CHIEF COMPLAINT QUOTE
pt presents to the ED c/o UE and LE pain/ numbness and right sided neck pain s/p fall of truck missing one step landing on right arm and right knee. Denies: head injury, loc, cp, sob, abd pain, n/v.

## 2021-09-17 NOTE — ED PROVIDER NOTE - OBJECTIVE STATEMENT
29 yo F presenting with R shoulder pain, R hand pain, R knee and hip pain s/p fall. Pt states she missed a step coming out of her truck at work and fell onto her right side. Denies hitting head on floor. No LOC. Ambulatory after fall. No cp/sob. No numbness/tingling/weakness.

## 2021-09-17 NOTE — ED PROVIDER NOTE - PHYSICAL EXAMINATION
VITALS: reviewed  GEN: NAD, A & O x 4  HEAD/EYES: NCAT, PERRL, EOMI, anicteric sclerae, no conjunctival pallor  ENT: mucus membranes moist, oropharynx WNL, trachea midline  RESP: lungs CTA with equal breath sounds bilaterally, chest wall nontender and atraumatic  CV: heart with reg rhythm S1, S2, distal pulses intact and symmetric bilaterally  ABDOMEN: normoactive bowel sounds, soft, nondistended, nontender, no palpable masses  : no CVAT  MSK: tenderness over 5th metacarpal on R hand. No ttp over scaphoid. ttp over medial aspect of R knee, R shoulder. FROM all extremities. the back is without midline or lateral tenderness, there is no spinal deformity or stepoff and the back is ranged painlessly. the neck has no midline tenderness, deformity, or stepoff, and is ranged painlessly.  SKIN: warm, dry, no rash, no bruising, no cyanosis. color appropriate for ethnicity  NEURO: alert, mentating appropriately, no facial asymmetry. gross sensation, motor, coordination are intact  PSYCH: Affect appropriate

## 2021-09-18 PROBLEM — S83.519A SPRAIN OF ANTERIOR CRUCIATE LIGAMENT OF UNSPECIFIED KNEE, INITIAL ENCOUNTER: Chronic | Status: ACTIVE | Noted: 2021-06-09

## 2021-09-18 PROBLEM — M50.20 OTHER CERVICAL DISC DISPLACEMENT, UNSPECIFIED CERVICAL REGION: Chronic | Status: ACTIVE | Noted: 2021-06-09

## 2021-09-18 PROBLEM — M54.31 SCIATICA, RIGHT SIDE: Chronic | Status: ACTIVE | Noted: 2021-06-09

## 2022-01-06 ENCOUNTER — OUTPATIENT (OUTPATIENT)
Dept: OUTPATIENT SERVICES | Facility: HOSPITAL | Age: 32
LOS: 1 days | End: 2022-01-06

## 2022-01-06 DIAGNOSIS — M50.20 OTHER CERVICAL DISC DISPLACEMENT, UNSPECIFIED CERVICAL REGION: Chronic | ICD-10-CM

## 2022-01-06 DIAGNOSIS — Z20.822 CONTACT WITH AND (SUSPECTED) EXPOSURE TO COVID-19: ICD-10-CM

## 2022-01-06 DIAGNOSIS — Z98.890 OTHER SPECIFIED POSTPROCEDURAL STATES: Chronic | ICD-10-CM

## 2022-01-06 LAB — SARS-COV-2 RNA SPEC QL NAA+PROBE: SIGNIFICANT CHANGE UP

## 2022-01-26 NOTE — ED ADULT NURSE NOTE - PAIN RATING/NUMBER SCALE (0-10): REST
Chief Complaint   Patient presents with    Follow-up     4 week   1. Have you been to the ER, urgent care clinic since your last visit? Hospitalized since your last visit? No    2. Have you seen or consulted any other health care providers outside of the [address] [place] [address] since your last visit? Include any pap smears or colon screening.  No 9

## 2022-02-23 ENCOUNTER — RESULT REVIEW (OUTPATIENT)
Age: 32
End: 2022-02-23

## 2022-03-14 ENCOUNTER — RESULT REVIEW (OUTPATIENT)
Age: 32
End: 2022-03-14

## 2022-03-14 ENCOUNTER — OUTPATIENT (OUTPATIENT)
Dept: OUTPATIENT SERVICES | Facility: HOSPITAL | Age: 32
LOS: 1 days | End: 2022-03-14
Payer: COMMERCIAL

## 2022-03-14 VITALS — OXYGEN SATURATION: 100 % | HEART RATE: 104 BPM

## 2022-03-14 DIAGNOSIS — Z3A.00 WEEKS OF GESTATION OF PREGNANCY NOT SPECIFIED: ICD-10-CM

## 2022-03-14 DIAGNOSIS — O26.899 OTHER SPECIFIED PREGNANCY RELATED CONDITIONS, UNSPECIFIED TRIMESTER: ICD-10-CM

## 2022-03-14 DIAGNOSIS — M50.20 OTHER CERVICAL DISC DISPLACEMENT, UNSPECIFIED CERVICAL REGION: Chronic | ICD-10-CM

## 2022-03-14 DIAGNOSIS — Z98.890 OTHER SPECIFIED POSTPROCEDURAL STATES: Chronic | ICD-10-CM

## 2022-03-14 LAB
ALBUMIN SERPL ELPH-MCNC: 3.9 G/DL — SIGNIFICANT CHANGE UP (ref 3.3–5)
ALP SERPL-CCNC: 128 U/L — HIGH (ref 40–120)
ALT FLD-CCNC: 39 U/L — SIGNIFICANT CHANGE UP (ref 10–45)
AMYLASE P1 CFR SERPL: 70 U/L — SIGNIFICANT CHANGE UP (ref 25–125)
ANION GAP SERPL CALC-SCNC: 11 MMOL/L — SIGNIFICANT CHANGE UP (ref 5–17)
APPEARANCE UR: ABNORMAL
AST SERPL-CCNC: 21 U/L — SIGNIFICANT CHANGE UP (ref 10–40)
BACTERIA # UR AUTO: ABNORMAL
BASOPHILS # BLD AUTO: 0.04 K/UL — SIGNIFICANT CHANGE UP (ref 0–0.2)
BASOPHILS NFR BLD AUTO: 0.2 % — SIGNIFICANT CHANGE UP (ref 0–2)
BILIRUB SERPL-MCNC: 0.4 MG/DL — SIGNIFICANT CHANGE UP (ref 0.2–1.2)
BILIRUB UR-MCNC: NEGATIVE — SIGNIFICANT CHANGE UP
BUN SERPL-MCNC: 5 MG/DL — LOW (ref 7–23)
CALCIUM SERPL-MCNC: 9.2 MG/DL — SIGNIFICANT CHANGE UP (ref 8.4–10.5)
CHLORIDE SERPL-SCNC: 103 MMOL/L — SIGNIFICANT CHANGE UP (ref 96–108)
CO2 SERPL-SCNC: 24 MMOL/L — SIGNIFICANT CHANGE UP (ref 22–31)
COLOR SPEC: SIGNIFICANT CHANGE UP
CREAT SERPL-MCNC: 0.75 MG/DL — SIGNIFICANT CHANGE UP (ref 0.5–1.3)
DIFF PNL FLD: ABNORMAL
EGFR: 108 ML/MIN/1.73M2 — SIGNIFICANT CHANGE UP
EOSINOPHIL # BLD AUTO: 0.15 K/UL — SIGNIFICANT CHANGE UP (ref 0–0.5)
EOSINOPHIL NFR BLD AUTO: 0.9 % — SIGNIFICANT CHANGE UP (ref 0–6)
EPI CELLS # UR: 3 /HPF — SIGNIFICANT CHANGE UP
GLUCOSE SERPL-MCNC: 87 MG/DL — SIGNIFICANT CHANGE UP (ref 70–99)
GLUCOSE UR QL: NEGATIVE — SIGNIFICANT CHANGE UP
HCT VFR BLD CALC: 36.9 % — SIGNIFICANT CHANGE UP (ref 34.5–45)
HGB BLD-MCNC: 12 G/DL — SIGNIFICANT CHANGE UP (ref 11.5–15.5)
HYALINE CASTS # UR AUTO: 1 /LPF — SIGNIFICANT CHANGE UP (ref 0–2)
IMM GRANULOCYTES NFR BLD AUTO: 0.5 % — SIGNIFICANT CHANGE UP (ref 0–1.5)
KETONES UR-MCNC: NEGATIVE — SIGNIFICANT CHANGE UP
LEUKOCYTE ESTERASE UR-ACNC: ABNORMAL
LIDOCAIN IGE QN: 18 U/L — SIGNIFICANT CHANGE UP (ref 7–60)
LYMPHOCYTES # BLD AUTO: 1.72 K/UL — SIGNIFICANT CHANGE UP (ref 1–3.3)
LYMPHOCYTES # BLD AUTO: 10.4 % — LOW (ref 13–44)
MCHC RBC-ENTMCNC: 29.9 PG — SIGNIFICANT CHANGE UP (ref 27–34)
MCHC RBC-ENTMCNC: 32.5 GM/DL — SIGNIFICANT CHANGE UP (ref 32–36)
MCV RBC AUTO: 92 FL — SIGNIFICANT CHANGE UP (ref 80–100)
MONOCYTES # BLD AUTO: 1.09 K/UL — HIGH (ref 0–0.9)
MONOCYTES NFR BLD AUTO: 6.6 % — SIGNIFICANT CHANGE UP (ref 2–14)
NEUTROPHILS # BLD AUTO: 13.41 K/UL — HIGH (ref 1.8–7.4)
NEUTROPHILS NFR BLD AUTO: 81.4 % — HIGH (ref 43–77)
NITRITE UR-MCNC: NEGATIVE — SIGNIFICANT CHANGE UP
NRBC # BLD: 0 /100 WBCS — SIGNIFICANT CHANGE UP (ref 0–0)
PH UR: 7.5 — SIGNIFICANT CHANGE UP (ref 5–8)
PLATELET # BLD AUTO: 251 K/UL — SIGNIFICANT CHANGE UP (ref 150–400)
POTASSIUM SERPL-MCNC: 4 MMOL/L — SIGNIFICANT CHANGE UP (ref 3.5–5.3)
POTASSIUM SERPL-SCNC: 4 MMOL/L — SIGNIFICANT CHANGE UP (ref 3.5–5.3)
PROT SERPL-MCNC: 7.1 G/DL — SIGNIFICANT CHANGE UP (ref 6–8.3)
PROT UR-MCNC: SIGNIFICANT CHANGE UP
RBC # BLD: 4.01 M/UL — SIGNIFICANT CHANGE UP (ref 3.8–5.2)
RBC # FLD: 13 % — SIGNIFICANT CHANGE UP (ref 10.3–14.5)
RBC CASTS # UR COMP ASSIST: 2 /HPF — SIGNIFICANT CHANGE UP (ref 0–4)
SODIUM SERPL-SCNC: 138 MMOL/L — SIGNIFICANT CHANGE UP (ref 135–145)
SP GR SPEC: 1.01 — SIGNIFICANT CHANGE UP (ref 1.01–1.02)
UROBILINOGEN FLD QL: NEGATIVE — SIGNIFICANT CHANGE UP
WBC # BLD: 16.49 K/UL — HIGH (ref 3.8–10.5)
WBC # FLD AUTO: 16.49 K/UL — HIGH (ref 3.8–10.5)
WBC UR QL: 7 /HPF — HIGH (ref 0–5)

## 2022-03-14 RX ORDER — ACETAMINOPHEN 500 MG
975 TABLET ORAL ONCE
Refills: 0 | Status: COMPLETED | OUTPATIENT
Start: 2022-03-14 | End: 2022-03-14

## 2022-03-14 RX ADMIN — Medication 975 MILLIGRAM(S): at 22:20

## 2022-03-14 RX ADMIN — Medication 975 MILLIGRAM(S): at 21:12

## 2022-03-14 NOTE — OB PROVIDER TRIAGE NOTE - ATTENDING COMMENTS
P0 presents with pain along lower abdomen and at epigastric region  localized pain on left lower quad   also reports back and left buttock pain  VSS   could be related to fibroid location and possible degeneration - consider indocin if tylenol doesn't work  also likely has sciatia pain - for PT referral  will correlate with labs and abdominal sono for epigastric -   non surgical abdomen -     Lyudmila Spence MD

## 2022-03-14 NOTE — OB PROVIDER TRIAGE NOTE - NSHPPHYSICALEXAM_GEN_ALL_CORE
Vital signs      PE:  Gen: in NAD  Abd: soft, gravid, obese, nontender  Back: no CVA tenderness bilaterally; TTP along left paraspinal muscles, reproducible  TAUS: FHR 154bpm, Fundal, +gross fetal movement, Nl AF  TVUS: CL 3.5-4cm, no dynamic changes  [place]: neg cx

## 2022-03-14 NOTE — OB RN TRIAGE NOTE - FALL HARM RISK - UNIVERSAL INTERVENTIONS
Bed in lowest position, wheels locked, appropriate side rails in place/Call bell, personal items and telephone in reach/Instruct patient to call for assistance before getting out of bed or chair/Non-slip footwear when patient is out of bed/[place] to call system/Physically safe environment - no spills, clutter or unnecessary equipment/Purposeful Proactive Rounding/Room/bathroom lighting operational, light cord in reach

## 2022-03-14 NOTE — OB PROVIDER TRIAGE NOTE - HISTORY OF PRESENT ILLNESS
30yo  at 15.2wk presenting with low back and abdominal pain which started yesterday around noon. Patient states she was sitting down when the pain started. Pain was initially on her left side, now feels it on her right. Pain is sharp and constant. Also endorses some lower abdominal and upper abdominal pain, nonspecific location. Patient states she has a history of right sided sciatica, and pain feels similar. She took Tylenol 500mg x1 yesterday with no relief. She endorses some chills, earlier, but denies fevers, n/v, vaginal bleeding, LOF, Cx, dysuria, vaginal discharge, change in bowel habits or other associated symptoms. PNC uncomplicated.    OBHx: Current  GYNHx: small fibroid, denies cysts, STIs, abnormal paps  PMH: Denies  PSH: ACL repair  Meds: PNV, Vit D, Calcium  All: NKDA  SocHx: Denies tobacco, alcohol, drug use; denies anxiety/depression  FamHx: Denies

## 2022-03-14 NOTE — OB PROVIDER TRIAGE NOTE - NSOBPROVIDERNOTE_OBGYN_ALL_OB_FT
30yo  at 15.2wks presenting with low back and abdominal pain which started yesterday. Back pain similar to past hx of sciatica, abdominal pain nonspecific and no tenderness noted on exam. No evidence of PTL at this time. Pain likely physiologic associated with pregnancy w/ sciatica and/or musculoskeletal.    -Tylenol 975mg x1  -UA/Ucx  -[place]    D/w Dr. McNally RFrankel PGY3 32yo  at 15.2wks presenting with low back and abdominal pain which started yesterday. Back pain similar to past hx of sciatica, abdominal pain nonspecific and no tenderness noted on exam. No evidence of PTL at this time. Pain likely physiologic associated with pregnancy w/ sciatica and/or musculoskeletal.    -Tylenol 975mg x1  -UA/Ucx  -[place]    D/w Dr. McNally RFrankel PGY3    ------    Pt reevaluated at bedside. Continues to endorse some pain, mostly in groin area. Otherwise feels well.                12.0   16.49 )-----------( 251      (  @ 22:49 )             36.9      @ 22:49    138  |  103  |  5   ----------------------------<  87  4.0   |  24  |  0.75    Ca    9.2       @ 22:49    TPro  7.1  /  Alb  3.9  /  TBili  0.4  /  DBili  x   /  AST  21  /  ALT  39  /  AlkPhos  128   @ 22:49    ACC: [id number] EXAM:  US ABDOMEN COMPLETE                          PROCEDURE DATE:  03/15/2022          INTERPRETATION:  CLINICAL INFORMATION: Diffuse abdominal pain. 15 weeks   pregnant.    COMPARISON: None available.    TECHNIQUE: Sonography of the abdomen.    FINDINGS:    Liver: Within normal limits.  Bile ducts: Normal caliber. Common bile duct measures 4 mm.  Gallbladder: Within normal limits.  Pancreas: Visualized portions are within normal limits.  Spleen: 8.1 cm. Within normal limits.  Right kidney: 10.2 cm. No hydronephrosis.  Left kidney: 10.2 cm. No hydronephrosis.  Ascites: None.  Aorta and IVC: Visualized portions are within normal limits.    IMPRESSION:  Normal abdominal ultrasound.        --- End of Report ---          RADHA BELL MD; Resident Radiologist  This document has been electronically signed.  LILA LUCERO MD; Attending Radiologist  This document has been electronically signed. Mar 15 2022  2:20AM    The above noted findings reviewed with patient. Reiterated that UA notable for possible UTI, otherwise labs and abdominal u/s wnl. Discussed possibility that pain 2/2 known 8cm fibroid which could be degenerating.   Patient to be sent home with Macrobid 100mg BID x5 days and Indomethacin 25mg q6hr x2 days. Patient to follow up in office this week. Return precautions reviewed.     Plan as per Dr. McNally RFrankel PGY3

## 2022-03-15 VITALS — OXYGEN SATURATION: 100 % | HEART RATE: 100 BPM

## 2022-03-15 PROCEDURE — 83690 ASSAY OF LIPASE: CPT

## 2022-03-15 PROCEDURE — 82150 ASSAY OF AMYLASE: CPT

## 2022-03-15 PROCEDURE — 76700 US EXAM ABDOM COMPLETE: CPT | Mod: 26

## 2022-03-15 PROCEDURE — 85025 COMPLETE CBC W/AUTO DIFF WBC: CPT

## 2022-03-15 PROCEDURE — 80053 COMPREHEN METABOLIC PANEL: CPT

## 2022-03-15 PROCEDURE — G0463: CPT

## 2022-03-15 PROCEDURE — 76700 US EXAM ABDOM COMPLETE: CPT

## 2022-03-15 PROCEDURE — 87086 URINE CULTURE/COLONY COUNT: CPT

## 2022-03-15 PROCEDURE — 81001 URINALYSIS AUTO W/SCOPE: CPT

## 2022-03-15 PROCEDURE — 36415 COLL VENOUS BLD VENIPUNCTURE: CPT

## 2022-03-15 RX ORDER — INDOMETHACIN 50 MG
1 CAPSULE ORAL
Qty: 8 | Refills: 0
Start: 2022-03-15 | End: 2022-03-16

## 2022-03-15 RX ORDER — INDOMETHACIN 50 MG
25 CAPSULE ORAL ONCE
Refills: 0 | Status: COMPLETED | OUTPATIENT
Start: 2022-03-15 | End: 2022-03-15

## 2022-03-15 RX ORDER — NITROFURANTOIN MACROCRYSTAL 50 MG
1 CAPSULE ORAL
Qty: 10 | Refills: 0
Start: 2022-03-15 | End: 2022-03-19

## 2022-03-15 RX ADMIN — Medication 25 MILLIGRAM(S): at 04:37

## 2022-03-16 LAB
CULTURE RESULTS: SIGNIFICANT CHANGE UP
SPECIMEN SOURCE: SIGNIFICANT CHANGE UP

## 2022-04-20 ENCOUNTER — ASOB RESULT (OUTPATIENT)
Age: 32
End: 2022-04-20

## 2022-04-20 ENCOUNTER — APPOINTMENT (OUTPATIENT)
Dept: ANTEPARTUM | Facility: CLINIC | Age: 32
End: 2022-04-20
Payer: COMMERCIAL

## 2022-04-20 PROCEDURE — 76811 OB US DETAILED SNGL FETUS: CPT

## 2022-04-29 ENCOUNTER — OUTPATIENT (OUTPATIENT)
Dept: OUTPATIENT SERVICES | Age: 32
LOS: 1 days | Discharge: HOME | End: 2022-04-29

## 2022-04-29 DIAGNOSIS — Z98.890 OTHER SPECIFIED POSTPROCEDURAL STATES: Chronic | ICD-10-CM

## 2022-04-29 DIAGNOSIS — M50.20 OTHER CERVICAL DISC DISPLACEMENT, UNSPECIFIED CERVICAL REGION: Chronic | ICD-10-CM

## 2022-05-06 ENCOUNTER — APPOINTMENT (OUTPATIENT)
Dept: PEDIATRIC CARDIOLOGY | Facility: CLINIC | Age: 32
End: 2022-05-06
Payer: COMMERCIAL

## 2022-05-06 PROCEDURE — 99241 OFFICE CONSULTATION NEW/ESTAB PATIENT 15 MIN: CPT | Mod: 25

## 2022-05-06 PROCEDURE — 76825 ECHO EXAM OF FETAL HEART: CPT

## 2022-05-06 PROCEDURE — 76820 UMBILICAL ARTERY ECHO: CPT

## 2022-05-06 PROCEDURE — 93325 DOPPLER ECHO COLOR FLOW MAPG: CPT | Mod: 59

## 2022-05-06 PROCEDURE — 76821 MIDDLE CEREBRAL ARTERY ECHO: CPT

## 2022-05-06 PROCEDURE — 76827 ECHO EXAM OF FETAL HEART: CPT

## 2022-09-02 NOTE — ED ADULT TRIAGE NOTE - NSWEIGHTCALCTOOLDRUG_GEN_A_CORE
Problem: Depressed Mood (Adult/Pediatric)  Goal: *STG: Participates in treatment plan  Outcome: Progressing Towards Goal  Goal: *STG: Verbalizes anger, guilt, and other feelings in a constructive manor  Outcome: Progressing Towards Goal  Variance Patient slowly responding     Problem: Depressed Mood (Adult/Pediatric)  Goal: *STG: Demonstrates reduction in symptoms and increase in insight into coping skills/future focused  Outcome: Not Progressing Towards Goal    Patient is calm, thought blocking. Denies si/hi.  used

## 2022-09-04 ENCOUNTER — OUTPATIENT (OUTPATIENT)
Dept: OUTPATIENT SERVICES | Facility: HOSPITAL | Age: 32
LOS: 1 days | End: 2022-09-04
Payer: COMMERCIAL

## 2022-09-04 VITALS — SYSTOLIC BLOOD PRESSURE: 125 MMHG | HEART RATE: 88 BPM | DIASTOLIC BLOOD PRESSURE: 74 MMHG

## 2022-09-04 VITALS — OXYGEN SATURATION: 98 % | HEART RATE: 109 BPM

## 2022-09-04 VITALS
TEMPERATURE: 99 F | HEART RATE: 88 BPM | DIASTOLIC BLOOD PRESSURE: 77 MMHG | SYSTOLIC BLOOD PRESSURE: 120 MMHG | RESPIRATION RATE: 18 BRPM

## 2022-09-04 VITALS — DIASTOLIC BLOOD PRESSURE: 69 MMHG | HEART RATE: 90 BPM | SYSTOLIC BLOOD PRESSURE: 117 MMHG

## 2022-09-04 DIAGNOSIS — Z98.890 OTHER SPECIFIED POSTPROCEDURAL STATES: Chronic | ICD-10-CM

## 2022-09-04 DIAGNOSIS — Z3A.00 WEEKS OF GESTATION OF PREGNANCY NOT SPECIFIED: ICD-10-CM

## 2022-09-04 DIAGNOSIS — O26.899 OTHER SPECIFIED PREGNANCY RELATED CONDITIONS, UNSPECIFIED TRIMESTER: ICD-10-CM

## 2022-09-04 PROCEDURE — 59025 FETAL NON-STRESS TEST: CPT

## 2022-09-04 PROCEDURE — G0463: CPT

## 2022-09-04 PROCEDURE — 99213 OFFICE O/P EST LOW 20 MIN: CPT

## 2022-09-04 NOTE — OB PROVIDER TRIAGE NOTE - NSOBPROVIDERNOTE_OBGYN_ALL_OB_FT
A/P: 31yoF  @40.1 weeks gestation (JULISA 22) presents in early labor, pt cervical exam unchanged from earlier today on exam, ctx irregular every 4-5mins, denies LOF/VB.   - NST reactive   - D/c home with labor precautions   - Pt advised to return to L&D if she experiencing any loss of fluid or vaginal bleeding, decrease FM.   - Pt to follow up in office as scheduled   - D/w JAMES QuirozC A/P: 31yoF  @40.1 weeks gestation (JULISA 22) presents in early labor, pt cervical exam unchanged from earlier today on exam, ctx irregular every 4-5mins, denies LOF/VB.   - NST reactive, BPP results from triage visit today at 6p, per Dr. Rodarte repeat BPP not needed at this time   - D/c home with labor precautions   - Pt advised to return to L&D if she experiencing any loss of fluid or vaginal bleeding, decrease FM.   - Pt to follow up in office as scheduled   - D/w CHERYL Quiroz-C

## 2022-09-04 NOTE — OB PROVIDER TRIAGE NOTE - NSOBPROVIDERNOTE_OBGYN_ALL_OB_FT
A/P: 32 yo P0 @ 40w1d presents in early labor  - NST and repeat VE in 2 hours    d/w Dr Cayden Nunez PA A/P: 30 yo P0 @ 40w1d presents in early labor  - NST and repeat VE in 2 hours    d/w Dr Cayden Nunez, PA    OB PA Addendum   Pt reevaluated 2 hours after initial VE- states pain is slightly stronger    ICU Vital Signs Last 24 Hrs  T(C): 37 (04 Sep 2022 15:37), Max: 37.0 (04 Sep 2022 15:23)  T(F): 98.6 (04 Sep 2022 15:37), Max: 98.6 (04 Sep 2022 15:23)  HR: 99 (04 Sep 2022 18:00) (90 - 99)  BP: 117/69 (04 Sep 2022 15:37) (117/69 - 117/69)  BP(mean): --  ABP: --  ABP(mean): --  RR: 14 (04 Sep 2022 15:37) (14 - 14)  SpO2: 100% (04 Sep 2022 18:00) (100% - 100%)    O2 Parameters below as of 04 Sep 2022 15:37  Patient On (Oxygen Delivery Method): room air        VE 1/80/-3 - unchanged    BPP vertex 8/8 LAMINE 9.06    Plan:  NST then discharge home if WNL  labor precautions given  return if dec FM, LOF or VB    d/w Dr Cayden Nunez PA

## 2022-09-04 NOTE — OB RN TRIAGE NOTE - FALL HARM RISK - UNIVERSAL INTERVENTIONS
Bed in lowest position, wheels locked, appropriate side rails in place/Call bell, personal items and telephone in reach/Instruct patient to call for assistance before getting out of bed or chair/Non-slip footwear when patient is out of bed/[place] to call system/Physically safe environment - no spills, clutter or unnecessary equipment/Purposeful Proactive Rounding/Room/bathroom lighting operational, light cord in reach

## 2022-09-04 NOTE — OB PROVIDER TRIAGE NOTE - NS ATTEND AMEND GEN_ALL_CORE FT
Patient is 40+wks presenting in early labor. Reassuring fetal status and regular contractions.   Cervical exam unchanged after prolonged monitoring.   Return precautions given   f/u in office for scheduled appointment     SHAISTA Rodarte

## 2022-09-04 NOTE — OB PROVIDER TRIAGE NOTE - NSHPPHYSICALEXAM_GEN_ALL_CORE
ICU Vital Signs Last 24 Hrs  T(C): 37.0 (04 Sep 2022 15:23), Max: 37.0 (04 Sep 2022 15:23)  T(F): 98.6 (04 Sep 2022 15:23), Max: 98.6 (04 Sep 2022 15:23)  HR: 90 (04 Sep 2022 15:22) (90 - 90)  BP: 117/69 (04 Sep 2022 15:22) (117/69 - 117/69)  BP(mean): --  ABP: --  ABP(mean): --  RR: --  SpO2: --    Gen: NAD  Heart: S1S2 RRR  Lungs: CTA b/l  Abd: Gravid NT  LE: no calf tenderness    VE 1/80/-3    FHT discontinuous  Nadine: q3min    Sono vertex

## 2022-09-04 NOTE — OB PROVIDER TRIAGE NOTE - HISTORY OF PRESENT ILLNESS
OB PA Triage Note     31yoF  @40.1 weeks gestation presents for ROL. Pt reports contractions have become more painful (8/10) occurring every 2-3 minutes, denies any LOF or VB. Endorses +FM. Pt seen and discharged from triage earlier this evening and noted to be 1/80/-3 on exam. GBS positive. Pt denies any other concerns.    – PNC: GBS (+). EFW 3400g.   – OBHx: primigravid   – GynHx: h/o 8cm fundal fibroid; denies ovarian cysts, abnl pap smears, STDs  – PMH: denies h/o HTN, DM, asthma, thyroid disorders, bleeding disorders, h/o blood transfusions   – PSH: (2016) left knee surgery   – Psych: denies anxiety/depression   – Social: denies alcohol/tobacco/drug use in pregnancy   – Meds: PNV, Vit D  – Allergies: Shellfish (hives)  – Will accept blood transfusions: Yes    Vital signs_    Gen: NAD  CV: RRR  Lungs: CTA b/l   Abd: gravid, non-tender  Ext: BLE non-edematous, no calf tenderness b/l     – Spec: pooling, nitrazine, ferning, bleeding,  (lesions if patient with HSV2 history)  – VE: //  – FHT: baseline 1, mod variability, +accels, -decels  – [place]: qmin (occasional/irregular/infrequent)  – EFW: _g   – Sono:       A/P:  yo G P @  - NST  - D/w Dr. Yris Sam, PA-C OB PA Triage Note     31yoF  @40.1 weeks gestation presents for ROL. Pt reports contractions have become more painful (8/10) occurring every 2-3 minutes, denies any LOF or VB. Endorses +FM. Pt not requesting for pain mgmt intervention at this time. Pt seen and discharged from triage earlier this evening and noted to be 1/80/-3 on exam. GBS positive. Pt denies any other concerns.    – PNC: IVF pregnancy. GBS (+). EFW 3400g.   – OBHx: primigravid   – GynHx: h/o 8cm fundal fibroid; denies ovarian cysts, abnl pap smears, STDs  – PMH: denies h/o HTN, DM, asthma, thyroid disorders, bleeding disorders, h/o blood transfusions   – PSH: (2016) left knee surgery   – Psych: denies anxiety/depression   – Social: denies alcohol/tobacco/drug use in pregnancy   – Meds: PNV, Vit D  – Allergies: Shellfish (hives)  – Will accept blood transfusions: Yes    Vital Signs Last 24 Hrs  T(C): 37.2 (04 Sep 2022 22:55), Max: 37.2 (04 Sep 2022 22:55)  T(F): 99 (04 Sep 2022 22:55), Max: 99 (04 Sep 2022 22:55)  HR: 102 (04 Sep 2022 23:27) (88 - 117)  BP: 114/66 (04 Sep 2022 22:55) (114/66 - 125/74)  RR: 16 (04 Sep 2022 22:55) (14 - 16)  SpO2: 97% (04 Sep 2022 23:27) (93% - 100%)    Gen: NAD  CV: RRR  Lungs: CTA b/l   Abd: gravid, non-tender  Ext: BLE non-edematous, no calf tenderness b/l     – VE: 1.5/80/-3, brown spotting   – FHT: baseline 140, mod variability, +accels, -decels  – [place]: irregular   – Sono: Vertex, BPP 8/8. LAMINE 9.06        OB PA Triage Note     31yoF  @40.1 weeks gestation presents for ROL. Pt reports contractions have become more painful (8/10) occurring every 2-3 minutes, denies any LOF or VB. Endorses +FM. Pt not requesting for pain mgmt intervention at this time. Pt seen and discharged from triage earlier this evening and noted to be 1/80/-3 on exam. GBS positive. Pt denies any other concerns.    – PNC: IVF pregnancy. GBS (+). EFW 3400g.   – OBHx: primigravid   – GynHx: h/o large posterior fibroid 9cm, denies ovarian cysts, abnl pap smears, STDs  – PMH: denies h/o HTN, DM, asthma, thyroid disorders, bleeding disorders, h/o blood transfusions   – PSH: (2016) left knee surgery   – Psych: denies anxiety/depression   – Social: denies alcohol/tobacco/drug use in pregnancy   – Meds: PNV, Vit D  – Allergies: Shellfish (hives)  – Will accept blood transfusions: Yes    Vital Signs Last 24 Hrs  T(C): 37.2 (04 Sep 2022 22:55), Max: 37.2 (04 Sep 2022 22:55)  T(F): 99 (04 Sep 2022 22:55), Max: 99 (04 Sep 2022 22:55)  HR: 102 (04 Sep 2022 23:27) (88 - 117)  BP: 114/66 (04 Sep 2022 22:55) (114/66 - 125/74)  RR: 16 (04 Sep 2022 22:55) (14 - 16)  SpO2: 97% (04 Sep 2022 23:27) (93% - 100%)    Gen: NAD  CV: RRR  Lungs: CTA b/l   Abd: gravid, non-tender  Ext: BLE non-edematous, no calf tenderness b/l     – VE: 1.5/80/-3, brown spotting   – FHT: baseline 140, mod variability, +accels, -decels  – [place]: irregular   – Sono: Vertex, BPP 8/8. LAMINE 9.06

## 2022-09-04 NOTE — OB PROVIDER TRIAGE NOTE - NS ATTEND AMEND GEN_ALL_CORE FT
Patient is P0 at 40+wks presenting in early labor. Patient reports pain has increased and persisted since being assessed earlier today. Denies vaginal bleeding or leakage of fluid. Reports fetal movement.   Chart reviewed   Past medical and surgical history reviewed.   Fetal tracing reassuring, irregular contractions   SVE 1-2cm, minimal change from this morning's exam   A/P: Reassuring fetal status, early labor   -d/c home with return precautions   -f/u for scheduled appt on Tuesday     SHAISTA Rodarte

## 2022-09-04 NOTE — OB PROVIDER TRIAGE NOTE - HISTORY OF PRESENT ILLNESS
OB PA Triage Note    30 yo G1 @ 40w1d by EDC of 9/3 via IVF with frozen embryo x 1 presents with CTX this AM - now q5min  +FM NO LOF/VB    PNC: uncomplicated  GBS +  EFW [id number] by sono 9/2    PMH: none  All: shellfish- hives  GYN: +8cm fundal fibroid  denies cysts/STI/abn pap  OB: primigravida  PSH: L knee meniscus and ACL repair  Social: denies toxic habits  Psych: denies history

## 2022-09-05 ENCOUNTER — TRANSCRIPTION ENCOUNTER (OUTPATIENT)
Age: 32
End: 2022-09-05

## 2022-09-05 ENCOUNTER — INPATIENT (INPATIENT)
Facility: HOSPITAL | Age: 32
LOS: 2 days | Discharge: HOME | End: 2022-09-08
Attending: OBSTETRICS & GYNECOLOGY | Admitting: OBSTETRICS & GYNECOLOGY
Payer: COMMERCIAL

## 2022-09-05 VITALS
TEMPERATURE: 98 F | SYSTOLIC BLOOD PRESSURE: 138 MMHG | DIASTOLIC BLOOD PRESSURE: 82 MMHG | RESPIRATION RATE: 18 BRPM | HEART RATE: 97 BPM

## 2022-09-05 DIAGNOSIS — Z98.890 OTHER SPECIFIED POSTPROCEDURAL STATES: Chronic | ICD-10-CM

## 2022-09-05 DIAGNOSIS — O26.899 OTHER SPECIFIED PREGNANCY RELATED CONDITIONS, UNSPECIFIED TRIMESTER: ICD-10-CM

## 2022-09-05 DIAGNOSIS — Z3A.00 WEEKS OF GESTATION OF PREGNANCY NOT SPECIFIED: ICD-10-CM

## 2022-09-05 DIAGNOSIS — Z34.80 ENCOUNTER FOR SUPERVISION OF OTHER NORMAL PREGNANCY, UNSPECIFIED TRIMESTER: ICD-10-CM

## 2022-09-05 LAB
BASOPHILS # BLD AUTO: 0.05 K/UL — SIGNIFICANT CHANGE UP (ref 0–0.2)
BASOPHILS NFR BLD AUTO: 0.3 % — SIGNIFICANT CHANGE UP (ref 0–2)
BLD GP AB SCN SERPL QL: NEGATIVE — SIGNIFICANT CHANGE UP
COVID-19 SPIKE DOMAIN AB INTERP: POSITIVE
COVID-19 SPIKE DOMAIN ANTIBODY RESULT: >250 U/ML — HIGH
EOSINOPHIL # BLD AUTO: 0.05 K/UL — SIGNIFICANT CHANGE UP (ref 0–0.5)
EOSINOPHIL NFR BLD AUTO: 0.3 % — SIGNIFICANT CHANGE UP (ref 0–6)
HCT VFR BLD CALC: 38.2 % — SIGNIFICANT CHANGE UP (ref 34.5–45)
HGB BLD-MCNC: 12.7 G/DL — SIGNIFICANT CHANGE UP (ref 11.5–15.5)
IMM GRANULOCYTES NFR BLD AUTO: 0.6 % — SIGNIFICANT CHANGE UP (ref 0–1.5)
LYMPHOCYTES # BLD AUTO: 1.41 K/UL — SIGNIFICANT CHANGE UP (ref 1–3.3)
LYMPHOCYTES # BLD AUTO: 8.7 % — LOW (ref 13–44)
MCHC RBC-ENTMCNC: 29.9 PG — SIGNIFICANT CHANGE UP (ref 27–34)
MCHC RBC-ENTMCNC: 33.2 GM/DL — SIGNIFICANT CHANGE UP (ref 32–36)
MCV RBC AUTO: 89.9 FL — SIGNIFICANT CHANGE UP (ref 80–100)
MONOCYTES # BLD AUTO: 0.91 K/UL — HIGH (ref 0–0.9)
MONOCYTES NFR BLD AUTO: 5.6 % — SIGNIFICANT CHANGE UP (ref 2–14)
NEUTROPHILS # BLD AUTO: 13.72 K/UL — HIGH (ref 1.8–7.4)
NEUTROPHILS NFR BLD AUTO: 84.5 % — HIGH (ref 43–77)
NRBC # BLD: 0 /100 WBCS — SIGNIFICANT CHANGE UP (ref 0–0)
PLATELET # BLD AUTO: 247 K/UL — SIGNIFICANT CHANGE UP (ref 150–400)
RBC # BLD: 4.25 M/UL — SIGNIFICANT CHANGE UP (ref 3.8–5.2)
RBC # FLD: 13.2 % — SIGNIFICANT CHANGE UP (ref 10.3–14.5)
RH IG SCN BLD-IMP: POSITIVE — SIGNIFICANT CHANGE UP
RH IG SCN BLD-IMP: POSITIVE — SIGNIFICANT CHANGE UP
SARS-COV-2 IGG+IGM SERPL QL IA: >250 U/ML — HIGH
SARS-COV-2 IGG+IGM SERPL QL IA: POSITIVE
SARS-COV-2 RNA SPEC QL NAA+PROBE: SIGNIFICANT CHANGE UP
WBC # BLD: 16.23 K/UL — HIGH (ref 3.8–10.5)
WBC # FLD AUTO: 16.23 K/UL — HIGH (ref 3.8–10.5)

## 2022-09-05 RX ORDER — SODIUM CHLORIDE 9 MG/ML
1000 INJECTION, SOLUTION INTRAVENOUS
Refills: 0 | Status: DISCONTINUED | OUTPATIENT
Start: 2022-09-05 | End: 2022-09-06

## 2022-09-05 RX ORDER — INFLUENZA VIRUS VACCINE 15; 15; 15; 15 UG/.5ML; UG/.5ML; UG/.5ML; UG/.5ML
0.5 SUSPENSION INTRAMUSCULAR ONCE
Refills: 0 | Status: COMPLETED | OUTPATIENT
Start: 2022-09-05 | End: 2022-09-05

## 2022-09-05 RX ORDER — AMPICILLIN TRIHYDRATE 250 MG
2 CAPSULE ORAL ONCE
Refills: 0 | Status: COMPLETED | OUTPATIENT
Start: 2022-09-05 | End: 2022-09-05

## 2022-09-05 RX ORDER — CITRIC ACID/SODIUM CITRATE 300-500 MG
15 SOLUTION, ORAL ORAL EVERY 6 HOURS
Refills: 0 | Status: DISCONTINUED | OUTPATIENT
Start: 2022-09-05 | End: 2022-09-06

## 2022-09-05 RX ORDER — OXYTOCIN 10 UNIT/ML
4 VIAL (ML) INJECTION
Qty: 30 | Refills: 0 | Status: DISCONTINUED | OUTPATIENT
Start: 2022-09-05 | End: 2022-09-08

## 2022-09-05 RX ORDER — OXYTOCIN 10 UNIT/ML
333.33 VIAL (ML) INJECTION
Qty: 20 | Refills: 0 | Status: DISCONTINUED | OUTPATIENT
Start: 2022-09-05 | End: 2022-09-08

## 2022-09-05 RX ORDER — CHLORHEXIDINE GLUCONATE 213 G/1000ML
1 SOLUTION TOPICAL ONCE
Refills: 0 | Status: DISCONTINUED | OUTPATIENT
Start: 2022-09-05 | End: 2022-09-06

## 2022-09-05 RX ORDER — AMPICILLIN TRIHYDRATE 250 MG
1 CAPSULE ORAL EVERY 4 HOURS
Refills: 0 | Status: DISCONTINUED | OUTPATIENT
Start: 2022-09-05 | End: 2022-09-06

## 2022-09-05 RX ADMIN — Medication 108 GRAM(S): at 16:37

## 2022-09-05 RX ADMIN — Medication 200 GRAM(S): at 12:19

## 2022-09-05 RX ADMIN — Medication 108 GRAM(S): at 20:38

## 2022-09-05 RX ADMIN — Medication 4 MILLIUNIT(S)/MIN: at 16:05

## 2022-09-05 NOTE — OB RN PATIENT PROFILE - FALL HARM RISK - UNIVERSAL INTERVENTIONS
Bed in lowest position, wheels locked, appropriate side rails in place/Call bell, personal items and telephone in reach/Instruct patient to call for assistance before getting out of bed or chair/Non-slip footwear when patient is out of bed/[place] to call system/Physically safe environment - no spills, clutter or unnecessary equipment/Purposeful Proactive Rounding/Room/bathroom lighting operational, light cord in reach

## 2022-09-05 NOTE — OB PROVIDER H&P - NSHPPHYSICALEXAM_GEN_ALL_CORE
ICU Vital Signs Last 24 Hrs  T(C): 36.8 (05 Sep 2022 11:49), Max: 37.2 (04 Sep 2022 22:55)  T(F): 98.2 (05 Sep 2022 11:49), Max: 99 (04 Sep 2022 22:55)  HR: 114 (05 Sep 2022 12:08) (88 - 117)  BP: 138/82 (05 Sep 2022 11:49) (114/66 - 138/82)  RR: 18 (05 Sep 2022 11:49) (14 - 18)  SpO2: 99% (05 Sep 2022 12:08) (93% - 100%)    O2 Parameters below as of 05 Sep 2022 11:49  Patient On (Oxygen Delivery Method): room air    gen: mild discomfort w/ ctx, otherwise NAD  cards: clear S1S2 RRR  pulm: CTA b/l  abd: soft, gravid. nontender throughout.  ve: 3/90/-3 intact

## 2022-09-05 NOTE — OB RN PATIENT PROFILE - EXTENSIONS OF SELF_ADULT
Pt seen for length of stay initial assessment.   Information obtained from: medical record, previous RD note, RN, and pt's daughter Roxanne Carrillo (called to [phone]).   Pt confused/disoriented as per documentation.
None

## 2022-09-05 NOTE — OB PROVIDER H&P - HISTORY OF PRESENT ILLNESS
31yoF  @40.1 weeks gestation presents for increasingly intense contractions, now 2 minutes apart with vaginal spotting and passage of mucus plug. Denies any LOF or VB. Endorses +FM. Pt requesting epidural. Pt seen and discharged from triage last night and noted to be /-3 on exam. GBS positive.    – PNC: IVF pregnancy. GBS (+). EFW 3400g.   – OBHx: primigravid   – GynHx: h/o 8cm fundal fibroid; denies ovarian cysts, abnl pap smears, STDs  – PMH: denies h/o HTN, DM, asthma, thyroid disorders, bleeding disorders, h/o blood transfusions   – PSH: (2016) left knee surgery   – Psych: denies anxiety/depression   – Social: denies alcohol/tobacco/drug use in pregnancy   – Meds: PNV, Vit D  – Allergies: Shellfish (hives)  – Will accept blood transfusions: Yes 31yoF  @40.1 weeks gestation presents for increasingly intense contractions, now 2 minutes apart with vaginal spotting and passage of mucus plug. Denies any LOF or VB. Endorses +FM. Pt requesting epidural. Pt seen and discharged from triage last night and noted to be 180/-3 on exam. GBS positive.    – PNC: IVF pregnancy. GBS (+). EFW 3400g.   – OBHx: primigravid   – GynHx: h/o large posterior fibroid 9.6 x 7.04 x 9.4 (measurement per last MFM sono 22); denies ovarian cysts, abnl pap smears, STDs  – PMH: denies h/o HTN, DM, asthma, thyroid disorders, bleeding disorders, h/o blood transfusions   – PSH: (2016) left knee surgery   – Psych: denies anxiety/depression   – Social: denies alcohol/tobacco/drug use in pregnancy   – Meds: PNV, Vit D  – Allergies: Shellfish (hives)  – Will accept blood transfusions: Yes

## 2022-09-05 NOTE — OB PROVIDER H&P - PROBLEM SELECTOR PLAN 1
Admit  IV access and IV fluids  Bicitra, Pepcid  Ampicillin for GBS ppx  Anesthesia consult for epidural.  Cont efm/toco  Re-evaluate in 3 hours to assess for cervical change.  d/w Dr. Jim.  CHERYL Patel Admit  IV access and IV fluids  Bicitra, Pepcid  Ampicillin for GBS ppx  Anesthesia consult for epidural.  Cont efm/toco  Re-evaluate in 3 hours to assess for cervical change.  d/w Dr. Jim.  PA [place]    Addendum   h/o large posterior fibroid 9.6 x 7.04 x 9.4 (measurement per last MFM sono 4/20/22)  Elevated PPH risk 2/2 large myoma and BMI 39

## 2022-09-05 NOTE — OB PROVIDER H&P - LABOR: CERVICAL DILATION
75 y/o Male, ambulatory with a cane and a walker, with h/o A. fib on Pradaxa, COPD, GERD, HLD, abdominal hernia, pulmonary fibrosis, RBBB, BRIEN on CPAP at night, chronic b/l LE lymphedema, mild dementia, urinary incontinence, and recurrent UTIs came in after a fall. Patient is confused, most of the history is by wife. Per wife, she has been noticing that the patient was feeling weak in the past couple of days. Last night around 1am, when he was getting out of the bathroom, he feel. His back hit the tub. Denies any head trauma, or LOC. Denies near syncope or dizziness. After the fall patient started complaining of lower back pain so he was bought to the ER. Wife also complaints that she heard him wheezing overnight. Denies any cough. No other complaints at this time.    PSHx: SBO and appendectomy  SH: denies smoking, drinks alcohol occasionally, denies illicit drug use. Lives with wife at home. Uses a cane/walker to ambulate  GOC: Discussed with wife, she is indecisive at this time. Patient remains full code for now. Primary team to discuss again. 77 y/o Male, ambulatory with a cane and a walker, with h/o A. fib on Pradaxa, COPD, GERD, HLD, abdominal hernia, pulmonary fibrosis, RBBB, BRIEN on CPAP at night, chronic b/l LE lymphedema, mild dementia, urinary incontinence, and recurrent UTIs came in after a fall.   Patient is confused, most of the history is by wife. Per wife, she has been noticing that the patient was feeling weak in the past couple of days. Last night around 1am, when he was getting out of the bathroom, he feel. His back hit the tub. Denies any head trauma, or LOC. Denies near syncope or dizziness. After the fall patient started complaining of lower back pain so he was bought to the ER.   Wife also complaints that she heard him wheezing overnight. Denies any cough. No other complaints at this time.  in Ed patient was placed on Bipap, removed this morning onto nasal canula    PSHx: SBO and appendectomy  SH: denies smoking, drinks alcohol occasionally, denies illicit drug use. Lives with wife at home. Uses a cane/walker to ambulate  GOC: Discussed with wife, she is indecisive at this time. Patient remains full code for now. Primary team to discuss again. 2-3.9 cm

## 2022-09-05 NOTE — OB PROVIDER H&P - ATTENDING COMMENTS
30 yo  at 40 2/7 weeks presents in latent labor requesting an epidural with cervical change after multiple exams for prodromal labor. GBS positive.   IVF pregnancy    Pt was started on pitocin augmentation and AROM    She recently had a temp of 38.3 C     Tracing has been reactive with tachycardia with the temp and nowearly decels, moderate variability  IUPC-ctx adequate since at least 12:50 AM  VE: 6/80/-2  sono at bedside revealed fundal fibroid and OP position    A/P: Labor Augmentation at term  -Amp and Gent for GBS and fever  -tylenol  -consents signed  -Pt is now in labor after a long latent labor-will recheck in 2 hours from last exam.  -Peanut ball and "cowgirl" position to try to turn the head  Jermain Jim MD

## 2022-09-05 NOTE — OB RN PATIENT PROFILE - NSPRENATALGBS_OBGYN_ALL_OB_START_DATE
Render Post-Care Instructions In Note?: no Consent: The patient's consent was obtained including but not limited to risks of crusting, scabbing, blistering, scarring, darker or lighter pigmentary change, recurrence, incomplete removal and infection. Detail Level: Detailed Number Of Freeze-Thaw Cycles: 2 freeze-thaw cycles Post-Care Instructions: I reviewed with the patient in detail post-care instructions. Patient is to wear sunprotection, and avoid picking at any of the treated lesions. Pt may apply Vaseline to crusted or scabbing areas. Duration Of Freeze Thaw-Cycle (Seconds): 5 08-Aug-2022

## 2022-09-06 ENCOUNTER — TRANSCRIPTION ENCOUNTER (OUTPATIENT)
Age: 32
End: 2022-09-06

## 2022-09-06 LAB — T PALLIDUM AB TITR SER: NEGATIVE — SIGNIFICANT CHANGE UP

## 2022-09-06 PROCEDURE — 88307 TISSUE EXAM BY PATHOLOGIST: CPT | Mod: 26

## 2022-09-06 DEVICE — SURGICEL POWDER 3 GRAMS: Type: IMPLANTABLE DEVICE | Status: FUNCTIONAL

## 2022-09-06 RX ORDER — ONDANSETRON 8 MG/1
4 TABLET, FILM COATED ORAL EVERY 6 HOURS
Refills: 0 | Status: DISCONTINUED | OUTPATIENT
Start: 2022-09-06 | End: 2022-09-07

## 2022-09-06 RX ORDER — ACETAMINOPHEN 500 MG
975 TABLET ORAL
Refills: 0 | Status: DISCONTINUED | OUTPATIENT
Start: 2022-09-06 | End: 2022-09-08

## 2022-09-06 RX ORDER — MAGNESIUM HYDROXIDE 400 MG/1
30 TABLET, CHEWABLE ORAL
Refills: 0 | Status: DISCONTINUED | OUTPATIENT
Start: 2022-09-06 | End: 2022-09-08

## 2022-09-06 RX ORDER — ACETAMINOPHEN 500 MG
975 TABLET ORAL ONCE
Refills: 0 | Status: COMPLETED | OUTPATIENT
Start: 2022-09-06 | End: 2022-09-06

## 2022-09-06 RX ORDER — HEPARIN SODIUM 5000 [USP'U]/ML
5000 INJECTION INTRAVENOUS; SUBCUTANEOUS EVERY 12 HOURS
Refills: 0 | Status: DISCONTINUED | OUTPATIENT
Start: 2022-09-06 | End: 2022-09-08

## 2022-09-06 RX ORDER — SIMETHICONE 80 MG/1
80 TABLET, CHEWABLE ORAL EVERY 4 HOURS
Refills: 0 | Status: DISCONTINUED | OUTPATIENT
Start: 2022-09-06 | End: 2022-09-08

## 2022-09-06 RX ORDER — OXYCODONE HYDROCHLORIDE 5 MG/1
5 TABLET ORAL
Refills: 0 | Status: DISCONTINUED | OUTPATIENT
Start: 2022-09-06 | End: 2022-09-07

## 2022-09-06 RX ORDER — TETANUS TOXOID, REDUCED DIPHTHERIA TOXOID AND ACELLULAR PERTUSSIS VACCINE, ADSORBED 5; 2.5; 8; 8; 2.5 [IU]/.5ML; [IU]/.5ML; UG/.5ML; UG/.5ML; UG/.5ML
0.5 SUSPENSION INTRAMUSCULAR ONCE
Refills: 0 | Status: DISCONTINUED | OUTPATIENT
Start: 2022-09-06 | End: 2022-09-08

## 2022-09-06 RX ORDER — DIPHENHYDRAMINE HCL 50 MG
25 CAPSULE ORAL EVERY 6 HOURS
Refills: 0 | Status: DISCONTINUED | OUTPATIENT
Start: 2022-09-06 | End: 2022-09-08

## 2022-09-06 RX ORDER — DEXAMETHASONE 0.5 MG/5ML
4 ELIXIR ORAL EVERY 6 HOURS
Refills: 0 | Status: DISCONTINUED | OUTPATIENT
Start: 2022-09-06 | End: 2022-09-07

## 2022-09-06 RX ORDER — SODIUM CHLORIDE 9 MG/ML
1000 INJECTION, SOLUTION INTRAVENOUS
Refills: 0 | Status: DISCONTINUED | OUTPATIENT
Start: 2022-09-06 | End: 2022-09-08

## 2022-09-06 RX ORDER — NALBUPHINE HYDROCHLORIDE 10 MG/ML
2.5 INJECTION, SOLUTION INTRAMUSCULAR; INTRAVENOUS; SUBCUTANEOUS EVERY 6 HOURS
Refills: 0 | Status: DISCONTINUED | OUTPATIENT
Start: 2022-09-06 | End: 2022-09-07

## 2022-09-06 RX ORDER — NALOXONE HYDROCHLORIDE 4 MG/.1ML
0.1 SPRAY NASAL
Refills: 0 | Status: DISCONTINUED | OUTPATIENT
Start: 2022-09-06 | End: 2022-09-07

## 2022-09-06 RX ORDER — IBUPROFEN 200 MG
600 TABLET ORAL EVERY 6 HOURS
Refills: 0 | Status: COMPLETED | OUTPATIENT
Start: 2022-09-06 | End: 2023-08-05

## 2022-09-06 RX ORDER — GENTAMICIN SULFATE 40 MG/ML
400 VIAL (ML) INJECTION ONCE
Refills: 0 | Status: COMPLETED | OUTPATIENT
Start: 2022-09-06 | End: 2022-09-06

## 2022-09-06 RX ORDER — MORPHINE SULFATE 50 MG/1
2 CAPSULE, EXTENDED RELEASE ORAL ONCE
Refills: 0 | Status: DISCONTINUED | OUTPATIENT
Start: 2022-09-06 | End: 2022-09-07

## 2022-09-06 RX ORDER — OXYTOCIN 10 UNIT/ML
333.33 VIAL (ML) INJECTION
Qty: 20 | Refills: 0 | Status: DISCONTINUED | OUTPATIENT
Start: 2022-09-06 | End: 2022-09-08

## 2022-09-06 RX ORDER — KETOROLAC TROMETHAMINE 30 MG/ML
30 SYRINGE (ML) INJECTION EVERY 6 HOURS
Refills: 0 | Status: DISCONTINUED | OUTPATIENT
Start: 2022-09-06 | End: 2022-09-07

## 2022-09-06 RX ORDER — OXYCODONE HYDROCHLORIDE 5 MG/1
5 TABLET ORAL
Refills: 0 | Status: COMPLETED | OUTPATIENT
Start: 2022-09-06 | End: 2022-09-13

## 2022-09-06 RX ORDER — OXYCODONE HYDROCHLORIDE 5 MG/1
5 TABLET ORAL ONCE
Refills: 0 | Status: DISCONTINUED | OUTPATIENT
Start: 2022-09-06 | End: 2022-09-08

## 2022-09-06 RX ORDER — LANOLIN
1 OINTMENT (GRAM) TOPICAL EVERY 6 HOURS
Refills: 0 | Status: DISCONTINUED | OUTPATIENT
Start: 2022-09-06 | End: 2022-09-08

## 2022-09-06 RX ADMIN — Medication 108 GRAM(S): at 05:07

## 2022-09-06 RX ADMIN — Medication 975 MILLIGRAM(S): at 02:49

## 2022-09-06 RX ADMIN — Medication 30 MILLIGRAM(S): at 21:07

## 2022-09-06 RX ADMIN — Medication 30 MILLIGRAM(S): at 13:38

## 2022-09-06 RX ADMIN — Medication 30 MILLIGRAM(S): at 20:07

## 2022-09-06 RX ADMIN — Medication 975 MILLIGRAM(S): at 03:15

## 2022-09-06 RX ADMIN — HEPARIN SODIUM 5000 UNIT(S): 5000 INJECTION INTRAVENOUS; SUBCUTANEOUS at 20:08

## 2022-09-06 RX ADMIN — Medication 600 MILLIGRAM(S): at 03:04

## 2022-09-06 RX ADMIN — Medication 108 GRAM(S): at 00:51

## 2022-09-06 RX ADMIN — Medication 30 MILLIGRAM(S): at 14:15

## 2022-09-06 NOTE — OB RN DELIVERY SUMMARY - NSSELHIDDEN_OBGYN_ALL_OB_FT
[NS_DeliveryAttending1_OBGYN_ALL_OB_FT:[id number]],[NS_DeliveryRN_OBGYN_ALL_OB_FT:[id number]] [NS_DeliveryAttending1_OBGYN_ALL_OB_FT:[id number]],[NS_DeliveryRN_OBGYN_ALL_OB_FT:[id number]=],[NS_DeliveryAssist2_OBGYN_ALL_OB_FT:[id number]=]

## 2022-09-06 NOTE — OB RN INTRAOPERATIVE NOTE - NSSELHIDDEN_OBGYN_ALL_OB_FT
[NS_DeliveryAttending1_OBGYN_ALL_OB_FT:[id number]],[NS_DeliveryRN_OBGYN_ALL_OB_FT:[id number]] [NS_DeliveryAttending1_OBGYN_ALL_OB_FT:[id number]],[NS_DeliveryRN_OBGYN_ALL_OB_FT:[id number]=],[NS_CirculateRN2_OBGYN_ALL_OB_FT:[id number]]

## 2022-09-06 NOTE — DISCHARGE NOTE OB - CARE PROVIDER_API CALL
Jermain Jim  OBSTETRICS AND GYNECOLOGY  [address], [address]  [place] [address]  Phone: [phone]  Fax: [phone]  Follow Up Time:

## 2022-09-06 NOTE — OB PROVIDER DELIVERY SUMMARY - NSPPHNORISK_OBGYN_ALL_OB
After evaluating the patient it has been determined they are at risk for postpartum hemorrhage. Statement Selected

## 2022-09-06 NOTE — DISCHARGE NOTE OB - MEDICATION SUMMARY - MEDICATIONS TO TAKE
I will START or STAY ON the medications listed below when I get home from the hospital:    acetaminophen 325 mg oral tablet  -- 3 tab(s) by mouth every 6 hours  -- Indication: For pain    ibuprofen 600 mg oral tablet  -- 1 tab(s) by mouth every 6 hours  -- Indication: For pain    oxyCODONE 5 mg oral tablet  -- 1 tab(s) by mouth every 8 hours, As Needed -for severe pain MDD:4   -- Caution federal law prohibits the transfer of this drug to any person other  than the person for whom it was prescribed.  It is very important that you take or use this exactly as directed.  Do not skip doses or discontinue unless directed by your doctor.  May cause drowsiness or dizziness.  This prescription cannot be refilled.  Using more of this medication than prescribed may cause serious breathing problems.    -- Indication: For pain

## 2022-09-06 NOTE — OB PROVIDER LABOR PROGRESS NOTE - NS_OBIHIFHRDETAILS_OBGYN_ALL_OB_FT
cat 1
FHR moderate variability, variables present, occasional decels
145 mod variability +accels -decels

## 2022-09-06 NOTE — OB RN DELIVERY SUMMARY - NS_LABORCHARACTER_OBGYN_ALL_OB
Induction of labor-AROM/Induction of labor-Medicinal/Febrile (>38C)/External electronic FM/Meconium staining

## 2022-09-06 NOTE — DISCHARGE NOTE OB - NS MD DC FALL RISK RISK
For information on Fall & Injury Prevention, visit: [email or web address] OR  [email or web address] OR  https://www.cdc.gov/steadi/patient.html

## 2022-09-06 NOTE — OB NEONATOLOGY/PEDIATRICIAN DELIVERY SUMMARY - NSCSDELIVASCHE_OBGYN_ALL_OB
Name: Javier Mccullough                               MRN: [id number]    Referred Program: [facility]    Contact Phone Number: [phone] (home)     Pt tested positive on a urine screen at work. Pt currently works for the Emergency Medical team with the [employer] and seeking tx for THC.     Substance Abuse:              Substance(s) of Choice: marijuana                
Unscheduled

## 2022-09-06 NOTE — OB RN DELIVERY SUMMARY - NS_SEPSISRSKCALC_OBGYN_ALL_OB_FT
EOS calculated successfully. EOS Risk Factor: 0.5/1000 live births ([facility] national incidence); GA=40w3d; Temp=100.94; ROM=9.683; GBS='Positive'; Antibiotics='GBS specific antibiotics > 2 hrs prior to birth'

## 2022-09-06 NOTE — DISCHARGE NOTE OB - CARE PLAN
Principal Discharge DX:	Single delivery by  section  Assessment and plan of treatment:	POD s/p primary  delivery for arrest of dilation at 6 cm in labor. She is stable for discharge on POD   1 Principal Discharge DX:	Single delivery by  section  Assessment and plan of treatment:	s/p primary  delivery for arrest of dilation at 6 cm in labor. She is stable for discharge on POD #2   -continue pain control   -continue ambulation and incentive spirometry use

## 2022-09-06 NOTE — OB PROVIDER LABOR PROGRESS NOTE - NS_SUBJECTIVE/OBJECTIVE_OBGYN_ALL_OB_FT
THA LUNA Note:    Pt seen and examined for progress. Pt comfortable with epidural. Denies ROM. Denies VB.     O:  ICU Vital Signs Last 24 Hrs  T(C): 36.7 (05 Sep 2022 15:08), Max: 37.2 (04 Sep 2022 22:55)  T(F): 98.06 (05 Sep 2022 15:08), Max: 99 (04 Sep 2022 22:55)  HR: 104 (05 Sep 2022 15:24) (88 - 137)  BP: 110/64 (05 Sep 2022 15:13) (88/53 - 138/82)  RR: 16 (05 Sep 2022 15:08) (14 - 18)  SpO2: 100% (05 Sep 2022 15:24) (90% - 100%)    O2 Parameters below as of 05 Sep 2022 12:23  Patient On (Oxygen Delivery Method): room air    gen: NAD   abd: soft, gravid  ve: 4/70/-3
Pt seen and examined at bedside for cervical change and successful AROM, light meconium. Pt remains comfortable with epidural at this time.     Vital Signs Last 24 Hrs  T(C): 36.7 (05 Sep 2022 15:08), Max: 37.2 (04 Sep 2022 22:55)  T(F): 98.06 (05 Sep 2022 15:08), Max: 99 (04 Sep 2022 22:55)  HR: 112 (05 Sep 2022 20:34) (84 - 137)  BP: 112/61 (05 Sep 2022 20:13) (88/53 - 138/82)  RR: 16 (05 Sep 2022 15:08) (16 - 18)  SpO2: 100% (05 Sep 2022 20:34) (90% - 100%)
Pt feels no pain or pressure

## 2022-09-06 NOTE — DISCHARGE NOTE OB - HOSPITAL COURSE
Patient was admitted and delivered by  for arrest of dilation at 6 cm after labor augmentation for prodromal labor. She had a female infant and is stable for discharge on POD Patient was admitted and delivered by  for arrest of dilation at 6 cm after labor augmentation for prodromal labor. She had a female infant and is stable for discharge on POD#2.   Postpartum course uncomplicated, vitals stable.

## 2022-09-06 NOTE — DISCHARGE NOTE OB - MEDICATION SUMMARY - MEDICATIONS TO STOP TAKING
I will STOP taking the medications listed below when I get home from the hospital:    Macrobid 100 mg oral capsule  -- 1 cap(s) by mouth every 12 hours   -- Finish all this medication unless otherwise directed by prescriber.  May discolor urine or feces.  Take with food or milk.    indomethacin 25 mg oral capsule  -- 1 cap(s) by mouth every 6 hours   -- Do not take aspirin or aspirin containing products without knowledge and consent of your physician.  It is very important that you take or use this exactly as directed.  Do not skip doses or discontinue unless directed by your doctor.  May cause drowsiness or dizziness.  Obtain medical advice before taking any non-prescription drugs as some may affect the action of this medication.  Take with food or milk.

## 2022-09-06 NOTE — OB PROVIDER DELIVERY SUMMARY - NSPROVIDERDELIVERYNOTE_OBGYN_ALL_OB_FT
viable infant, cephalic presentation, weight ______, APGARS ________  grossly normal uterus, b/l tubes and ovaries unable to visualized as uterus not exteriorized 2/2 fibroids  extension noted on the right side of the hysterotomy, hysterotomy +extension closed in on layer, extra bleeding noted on the left side of the hysterotomy, an imbricating layer was placed over this side of the hysterotomy  surgicel powder placed over the hysterotomy  rectus muscle reapproximated with _________ suture    _/_/_    Dictation #: viable infant, cephalic presentation, weight 6#11oz 3035g , APGARS 8/9  grossly normal uterus, b/l tubes and ovaries unable to visualized as uterus not exteriorized 2/2 fibroids  extension noted on the right side of the hysterotomy, hysterotomy +extension closed in on layer, extra bleeding noted on the left side of the hysterotomy, an imbricating layer was placed over this side of the hysterotomy  surgicel powder placed over the hysterotomy  rectus muscle reapproximated with chromic suture  fascia closed with 0 vicryl in running fashion  subcutaneous layer closed w/ vicryl running in one layer  skin closed using quill    540/2500/650    Dictation #:    Jessica Szymanski PGY2  Amyeo Jereen PGY2 viable infant, cephalic presentation, weight 6#11oz 3035g , APGARS 8/9  grossly normal uterus, b/l tubes and ovaries unable to visualized as uterus not exteriorized 2/2 fibroids  extension noted on the right side of the hysterotomy, hysterotomy +extension closed in rona layer with vicryl suture, extra bleeding noted on the left side of the hysterotomy, an imbricating layer was placed over this side of the hysterotomy with the same suture  surgicel powder placed over the hysterotomy  rectus muscle reapproximated with chromic suture  fascia closed with 0 vicryl in running fashion  subcutaneous layer closed w/ vicryl running in one layer  skin closed using quill    540/2500/650    Dictation #: [id number]    Jessica Szymanski PGY2  Amyeo Jereen PGY2

## 2022-09-06 NOTE — OB PROVIDER DELIVERY SUMMARY - NSSELHIDDEN_OBGYN_ALL_OB_FT
[NS_DeliveryAttending1_OBGYN_ALL_OB_FT:[id number]],[NS_DeliveryRN_OBGYN_ALL_OB_FT:[id number]] [NS_DeliveryAttending1_OBGYN_ALL_OB_FT:[id number]=],[NS_DeliveryRN_OBGYN_ALL_OB_FT:[id number]=],[NS_DeliveryAssist2_OBGYN_ALL_OB_FT:[id number]=]

## 2022-09-06 NOTE — OB NEONATOLOGY/PEDIATRICIAN DELIVERY SUMMARY - BABY A: APGAR 5 MIN COLOR, DELIVERY
no deformity/no fever/no numbness/no stiffness/no tingling/no weakness
(1) body pink, extremities blue

## 2022-09-06 NOTE — DISCHARGE NOTE OB - PLAN OF CARE
POD s/p primary  delivery for arrest of dilation at 6 cm in labor. She is stable for discharge on POD s/p primary  delivery for arrest of dilation at 6 cm in labor. She is stable for discharge on POD #2   -continue pain control   -continue ambulation and incentive spirometry use

## 2022-09-06 NOTE — DISCHARGE NOTE OB - PATIENT PORTAL LINK FT
You can access the FollowMyHealth Patient Portal offered by [facility] by registering at the following website: [email or web address]. By joining [employer]’s FollowMyHealth portal, you will also be able to view your health information using other applications (apps) compatible with our system.

## 2022-09-06 NOTE — OB PROVIDER LABOR PROGRESS NOTE - ASSESSMENT
A/P: Labor Arrest  -will proceed with a primary  delivery.  No change despite adequate contractions. Risks and benefits reviewed.  Jermain Jim MD
will begin pitocin.  cont efm/toco  re-examine at 7pm or sooner if clinically indicated.  d/w Dr. Jim.  CHERYL Patel
A/P: 30yo  @40.2 weeks admitted for labor. AROM light mec. GBS(+). Cat 1 tracing   - Continue Pitocin augmentation   - EFM continuous monitoring   - Ampicillin for GBS ppx  - Anticipated   - D/w Dr. Hernán Sam PA-C

## 2022-09-06 NOTE — OB RN DELIVERY SUMMARY - NS_FETALDEATH_OBGYN_ALL_OB_NU
Patient more lethargic this afternoon since pain medication late morning for lower extremity pain. Due to safety concerns for safety with mobility while lethargic and limited arrousability, will hold session. Will be seen over weekend for single session.   Angely WATSON, PTA (PTA [id number])    0

## 2022-09-06 NOTE — OB NEONATOLOGY/PEDIATRICIAN DELIVERY SUMMARY - NSPEDSNEONOTESA_OBGYN_ALL_OB_FT
Peds called to LDR/OR for Cat 2 tracing. 40.4 wk  AGA female born via CS to a  34 y/o  mother.  Maternal history of fibroids (9cm). Maternal labs include Blood Type B+  , HIV - , RPR NR , Rubella I , Hep B - , GBS + from , COVID - from . AROM at 2030 with meconium fluids (ROM hours: 10). Baby emerged vigorous, crying, was warmed, dried suctioned and stimulated with APGARS of 8/9 . Resuscitation included: bulb syringe. Mom plans to initiate breastfeeding, consents Hep B vaccine. Highest maternal temp: 38.3 . EOS 0.71    Physical Exam:  Gen: NAD, +grimace  HEENT: anterior fontanel open soft and flat, no cleft lip/palate, ears normal set, no ear pits or tags. nares clinically patent  Resp: no increased work of breathing, good air entry b/l, clear to auscultation bilaterally  Cardio: Normal S1/S2, regular rate and rhythm  Abd: soft, non tender, non distended, + bowel sounds  Neuro: +grasp/suck/stacey, normal tone  Extremities: negative fraser and ortolani, moving all extremities, full range of motion x 4, no crepitus  Skin: pink, warm  Genitals: Normal female anatomy Maico 1, anus patent

## 2022-09-07 LAB
BASOPHILS # BLD AUTO: 0.04 K/UL — SIGNIFICANT CHANGE UP (ref 0–0.2)
BASOPHILS NFR BLD AUTO: 0.2 % — SIGNIFICANT CHANGE UP (ref 0–2)
EOSINOPHIL # BLD AUTO: 0.17 K/UL — SIGNIFICANT CHANGE UP (ref 0–0.5)
EOSINOPHIL NFR BLD AUTO: 0.9 % — SIGNIFICANT CHANGE UP (ref 0–6)
HCT VFR BLD CALC: 30.5 % — LOW (ref 34.5–45)
HGB BLD-MCNC: 9.8 G/DL — LOW (ref 11.5–15.5)
IMM GRANULOCYTES NFR BLD AUTO: 0.6 % — SIGNIFICANT CHANGE UP (ref 0–1.5)
LYMPHOCYTES # BLD AUTO: 10.9 % — LOW (ref 13–44)
LYMPHOCYTES # BLD AUTO: 2.04 K/UL — SIGNIFICANT CHANGE UP (ref 1–3.3)
MCHC RBC-ENTMCNC: 30.2 PG — SIGNIFICANT CHANGE UP (ref 27–34)
MCHC RBC-ENTMCNC: 32.1 GM/DL — SIGNIFICANT CHANGE UP (ref 32–36)
MCV RBC AUTO: 93.8 FL — SIGNIFICANT CHANGE UP (ref 80–100)
MONOCYTES # BLD AUTO: 1.2 K/UL — HIGH (ref 0–0.9)
MONOCYTES NFR BLD AUTO: 6.4 % — SIGNIFICANT CHANGE UP (ref 2–14)
NEUTROPHILS # BLD AUTO: 15.13 K/UL — HIGH (ref 1.8–7.4)
NEUTROPHILS NFR BLD AUTO: 81 % — HIGH (ref 43–77)
NRBC # BLD: 0 /100 WBCS — SIGNIFICANT CHANGE UP (ref 0–0)
PLATELET # BLD AUTO: 194 K/UL — SIGNIFICANT CHANGE UP (ref 150–400)
RBC # BLD: 3.25 M/UL — LOW (ref 3.8–5.2)
RBC # FLD: 13.4 % — SIGNIFICANT CHANGE UP (ref 10.3–14.5)
WBC # BLD: 18.7 K/UL — HIGH (ref 3.8–10.5)
WBC # FLD AUTO: 18.7 K/UL — HIGH (ref 3.8–10.5)

## 2022-09-07 RX ORDER — ASCORBIC ACID 60 MG
500 TABLET,CHEWABLE ORAL DAILY
Refills: 0 | Status: DISCONTINUED | OUTPATIENT
Start: 2022-09-07 | End: 2022-09-08

## 2022-09-07 RX ORDER — OXYCODONE HYDROCHLORIDE 5 MG/1
5 TABLET ORAL
Refills: 0 | Status: DISCONTINUED | OUTPATIENT
Start: 2022-09-07 | End: 2022-09-08

## 2022-09-07 RX ORDER — FERROUS SULFATE 325(65) MG
325 TABLET ORAL DAILY
Refills: 0 | Status: DISCONTINUED | OUTPATIENT
Start: 2022-09-07 | End: 2022-09-08

## 2022-09-07 RX ORDER — IBUPROFEN 200 MG
600 TABLET ORAL EVERY 6 HOURS
Refills: 0 | Status: DISCONTINUED | OUTPATIENT
Start: 2022-09-07 | End: 2022-09-08

## 2022-09-07 RX ADMIN — Medication 975 MILLIGRAM(S): at 07:57

## 2022-09-07 RX ADMIN — Medication 600 MILLIGRAM(S): at 23:55

## 2022-09-07 RX ADMIN — Medication 975 MILLIGRAM(S): at 21:27

## 2022-09-07 RX ADMIN — Medication 975 MILLIGRAM(S): at 20:57

## 2022-09-07 RX ADMIN — Medication 30 MILLIGRAM(S): at 05:10

## 2022-09-07 RX ADMIN — Medication 600 MILLIGRAM(S): at 12:06

## 2022-09-07 RX ADMIN — Medication 975 MILLIGRAM(S): at 01:06

## 2022-09-07 RX ADMIN — HEPARIN SODIUM 5000 UNIT(S): 5000 INJECTION INTRAVENOUS; SUBCUTANEOUS at 20:56

## 2022-09-07 RX ADMIN — HEPARIN SODIUM 5000 UNIT(S): 5000 INJECTION INTRAVENOUS; SUBCUTANEOUS at 07:57

## 2022-09-07 RX ADMIN — Medication 30 MILLIGRAM(S): at 06:10

## 2022-09-07 RX ADMIN — Medication 600 MILLIGRAM(S): at 15:14

## 2022-09-07 RX ADMIN — Medication 975 MILLIGRAM(S): at 00:06

## 2022-09-07 RX ADMIN — Medication 975 MILLIGRAM(S): at 15:20

## 2022-09-07 RX ADMIN — Medication 600 MILLIGRAM(S): at 18:04

## 2022-09-07 NOTE — PROGRESS NOTE ADULT - ATTENDING COMMENTS
PT DOING WELL  VSS  INCISION C/D/I  H/H=9.8/30.5  IMP  STABLE  ANEMIA, MILD, 2 TO ACUTE BLOOD LOSS  ROUTINE PP CARE    J KOLE

## 2022-09-07 NOTE — PROGRESS NOTE ADULT - ASSESSMENT
31 y.o. G 1 P 1001      POD # 1 S/P Primary C/S for Failure to Dilate & Abnormal Fetal Status,  in stable condition.

## 2022-09-08 VITALS
TEMPERATURE: 98 F | RESPIRATION RATE: 18 BRPM | HEART RATE: 80 BPM | DIASTOLIC BLOOD PRESSURE: 73 MMHG | OXYGEN SATURATION: 100 % | SYSTOLIC BLOOD PRESSURE: 114 MMHG

## 2022-09-08 PROCEDURE — C1889: CPT

## 2022-09-08 PROCEDURE — 87635 SARS-COV-2 COVID-19 AMP PRB: CPT

## 2022-09-08 PROCEDURE — 86850 RBC ANTIBODY SCREEN: CPT

## 2022-09-08 PROCEDURE — G0463: CPT

## 2022-09-08 PROCEDURE — 85025 COMPLETE CBC W/AUTO DIFF WBC: CPT

## 2022-09-08 PROCEDURE — 88307 TISSUE EXAM BY PATHOLOGIST: CPT

## 2022-09-08 PROCEDURE — 86769 SARS-COV-2 COVID-19 ANTIBODY: CPT

## 2022-09-08 PROCEDURE — 86900 BLOOD TYPING SEROLOGIC ABO: CPT

## 2022-09-08 PROCEDURE — 86780 TREPONEMA PALLIDUM: CPT

## 2022-09-08 PROCEDURE — 59025 FETAL NON-STRESS TEST: CPT

## 2022-09-08 PROCEDURE — 59050 FETAL MONITOR W/REPORT: CPT

## 2022-09-08 PROCEDURE — 86901 BLOOD TYPING SEROLOGIC RH(D): CPT

## 2022-09-08 RX ORDER — ACETAMINOPHEN 500 MG
3 TABLET ORAL
Qty: 0 | Refills: 0 | DISCHARGE
Start: 2022-09-08

## 2022-09-08 RX ORDER — OXYCODONE HYDROCHLORIDE 5 MG/1
1 TABLET ORAL
Qty: 6 | Refills: 0
Start: 2022-09-08 | End: 2022-09-09

## 2022-09-08 RX ORDER — IBUPROFEN 200 MG
1 TABLET ORAL
Qty: 0 | Refills: 0 | DISCHARGE
Start: 2022-09-08

## 2022-09-08 RX ADMIN — Medication 500 MILLIGRAM(S): at 14:50

## 2022-09-08 RX ADMIN — OXYCODONE HYDROCHLORIDE 5 MILLIGRAM(S): 5 TABLET ORAL at 14:53

## 2022-09-08 RX ADMIN — Medication 325 MILLIGRAM(S): at 14:50

## 2022-09-08 RX ADMIN — Medication 600 MILLIGRAM(S): at 06:22

## 2022-09-08 RX ADMIN — OXYCODONE HYDROCHLORIDE 5 MILLIGRAM(S): 5 TABLET ORAL at 16:30

## 2022-09-08 RX ADMIN — Medication 600 MILLIGRAM(S): at 05:52

## 2022-09-08 RX ADMIN — Medication 600 MILLIGRAM(S): at 00:25

## 2022-09-08 RX ADMIN — Medication 975 MILLIGRAM(S): at 09:38

## 2022-09-08 RX ADMIN — OXYCODONE HYDROCHLORIDE 5 MILLIGRAM(S): 5 TABLET ORAL at 08:51

## 2022-09-08 RX ADMIN — OXYCODONE HYDROCHLORIDE 5 MILLIGRAM(S): 5 TABLET ORAL at 09:30

## 2022-09-08 RX ADMIN — Medication 975 MILLIGRAM(S): at 02:34

## 2022-09-08 RX ADMIN — Medication 600 MILLIGRAM(S): at 14:50

## 2022-09-08 RX ADMIN — Medication 600 MILLIGRAM(S): at 16:30

## 2022-09-08 RX ADMIN — HEPARIN SODIUM 5000 UNIT(S): 5000 INJECTION INTRAVENOUS; SUBCUTANEOUS at 08:51

## 2022-09-08 RX ADMIN — Medication 975 MILLIGRAM(S): at 03:05

## 2022-09-08 RX ADMIN — Medication 975 MILLIGRAM(S): at 08:51

## 2022-09-08 NOTE — PROGRESS NOTE ADULT - NS ATTEND AMEND GEN_ALL_CORE FT
Patient is POD #2 s/p primary CD, reports abdominal pain but has not taken Oxycodone. Denies chest pain, shortness of breath, nausea or vomiting.   Tolerating diet, ambulating and voiding spontaneously. + flatus   Female infant     Vitals reviewed   Gen: no acute distress   Abd: soft, nontender, fundus firm beneath umbilicus   Incision: clean/dry/intact   Perineum: minimal lochia rubra   Ext: no calf tenderness     Labs and meds reviewed     A/P: POD #2 s/p primary CD, currently doing well, inadequate pain control   -advised to take pain medication   -continue routine postpartum care   -continue to monitor vaginal bleeding   -DVT PPx: Heparin SQ   -for d/c home today   -return precautions reviewed     SHAISTA Rodarte

## 2022-09-08 NOTE — PROGRESS NOTE ADULT - PROBLEM SELECTOR PLAN 1
Increase OOB  PO Pain Protocol  Continue Regular Diet  Continue routine post op care
Cont. PP/PO Care

## 2022-09-08 NOTE — PROGRESS NOTE ADULT - SUBJECTIVE AND OBJECTIVE BOX
Day 1 of Anesthesia Pain Management Service    SUBJECTIVE:  Pain Scale Score:          [X] Refer to charted pain scores    THERAPY: Received 2 mg PF epidural morphine as above    OBJECTIVE:    Sedation:        	[X] Alert	[ ] Drowsy	[ ] Arousable      [ ] Asleep       [ ] Unresponsive    Side Effects:	[X] None	[ ] Nausea	[ ] Vomiting         [ ] Pruritus  		[ ] Weakness            [ ] Numbness	          [ ] Other:    ASSESSMENT/ PLAN  [X] Patient transitioned to prn analgesics  [X] Pain management per primary service, pain service to sign off   [X]Documentation and Verification of current medications
Day 0 of Anesthesia Pain Management Service    SUBJECTIVE: Doing ok  Pain Scale Score:          [X] Refer to charted pain scores    THERAPY:  s/p   2  mg PF epidural morphine on 9\6\2022      MEDICATIONS  (STANDING):  ampicillin  IVPB 1 Gram(s) IV Intermittent every 4 hours  chlorhexidine 2% Cloths 1 Application(s) Topical once  citric acid/sodium citrate Solution 15 milliLiter(s) Oral every 6 hours  clindamycin IVPB 900 milliGRAM(s) IV Intermittent once  dextrose 5% + lactated ringers. 1000 milliLiter(s) (125 mL/Hr) IV Continuous <Continuous>  influenza   Vaccine 0.5 milliLiter(s) IntraMuscular once  lactated ringers. 1000 milliLiter(s) (125 mL/Hr) IV Continuous <Continuous>  morphine PF Epidural 2 milliGRAM(s) Epidural once  oxytocin Infusion 333.333 milliUNIT(s)/Min (1000 mL/Hr) IV Continuous <Continuous>  oxytocin Infusion. 4 milliUNIT(s)/Min (4 mL/Hr) IV Continuous <Continuous>    MEDICATIONS  (PRN):  dexAMETHasone  Injectable 4 milliGRAM(s) IV Push every 6 hours PRN Nausea  nalbuphine Injectable 2.5 milliGRAM(s) IV Push every 6 hours PRN Pruritus  naloxone Injectable 0.1 milliGRAM(s) IV Push every 3 minutes PRN For ANY of the following changes in patient status:  A. Breaths Per Minute LESS THAN 10, B. Oxygen saturation LESS THAN 90%, C. Sedation score of 6 for Stop After: 4 Times  ondansetron Injectable 4 milliGRAM(s) IV Push every 6 hours PRN Nausea  oxyCODONE    IR 5 milliGRAM(s) Oral every 3 hours PRN Mild Pain (1 - 3)      OBJECTIVE:    Sedation:        	[X] Alert	 [ ] Drowsy	[ ] Arousable      [ ] Asleep       [ ] Unresponsive    Side Effects:	[X] None 	[ ] Nausea	[ ] Vomiting         [ ] Pruritus  		[ ] Weakness            [ ] Numbness	          [ ] Other:    Vital Signs Last 24 Hrs  T(C): 36.7 (06 Sep 2022 07:50), Max: 38.3 (06 Sep 2022 02:28)  T(F): 98.1 (06 Sep 2022 07:50), Max: 100.94 (06 Sep 2022 02:28)  HR: 92 (06 Sep 2022 09:40) (84 - 137)  BP: 124/81 (06 Sep 2022 09:20) (88/53 - 186/79)  BP(mean): 96 (06 Sep 2022 09:20) (72 - 96)  RR: 18 (06 Sep 2022 09:05) (16 - 18)  SpO2: 97% (06 Sep 2022 09:40) (84% - 100%)    Parameters below as of 06 Sep 2022 07:50  Patient On (Oxygen Delivery Method): room air        ASSESSMENT/ PLAN  [X] Patient to be transitioned to prn analgesics after24 hours  [X] Pain management per primary service, pain service to sign off   [X]Documentation and Verification of current medications
Postpartum Note-  Section POD#2    Allergies: No Known Drug Allergies    Blood type: B positive  Rubella: Immune  RPR: Negative     S: Patient is a 31y  POD#2 s/p pLTCS with a QBL of 540cc.    Patient w/o complaints, pain is controlled. Pt is OOB, tolerating PO, passing flatus, and voiding. Lochia WNL.     O:  Vital Signs Last 24 Hrs  T(C): 36.7 (08 Sep 2022 05:01), Max: 37.1 (08 Sep 2022 01:00)  T(F): 98.1 (08 Sep 2022 05:01), Max: 98.7 (08 Sep 2022 01:00)  HR: 83 (08 Sep 2022 05:01) (83 - 93)  BP: 120/84 (08 Sep 2022 05:01) (98/67 - 120/84)  BP(mean): --  RR: 17 (08 Sep 2022 05:01) (17 - 18)  SpO2: 96% (08 Sep 2022 05:01) (96% - 99%)    Parameters below as of 08 Sep 2022 05:01  Patient On (Oxygen Delivery Method): room air    Gen: NAD  Abdomen: Soft, nontender, non distended, fundus firm.  Incision: Clean, dry, and intact.  Negative erythema/edema/ecchymosis. SubQ  Lochia WNL  Ext: Neg edema, Neg calf tenderness    LABS:                          9.8    18.70 )-----------( 194      ( 07 Sep 2022 05:57 )             30.5       A/P:  31y POD#2 s/p pLTCS, doing well.    PMHx: Fibroid  Current Issues: Patient noted to have an intrapartum temperature, s/p abx, now afebrile. Patient noted to have mild anemia due to acute blood loss-vitals stable, pt asx, continue with Iron/Vitamin C    Increase OOB  PO Pain Protocol  Continue Regular Diet  Continue routine post op care    Stacey Torres PA-C      
PA POD # 1 C/S Note    Blood Type:    B+           Rubella:  Immune              RPR:  NR    Pain:  Controlled  Complaints:  None  Pt. is ambulating, Voiding, passing flatus, & has yet to eat but feels hungry.  Lochia:  WNL    VS:  T(C): 36.8 (09-07-22 @ 05:15), Max: 37.1 (09-06-22 @ 21:18)  HR: 75 (09-07-22 @ 05:15) (75 - 106)  BP: 97/58 (09-07-22 @ 05:15) (82/50 - 127/67)  RR: 17 (09-07-22 @ 05:15) (16 - 18)  SpO2: 97% (09-07-22 @ 05:15) (97% - 100%)                        9.8    18.70 )-----------( 194      ( 07 Sep 2022 05:57 )             30.5     Complete Blood Count + Automated Diff (09.05.22 @ 13:19)    WBC Count: 16.23 K/uL    Hemoglobin: 12.7 g/dL    Hematocrit: 38.2 %     Platelet Count - Automated: 247 K/uL      Abd:  Soft, non-distended, non-tender            Fundus-firm            Incision- C/D/I-SQ with Prineo; Dressing removed  Extremities:  Edema - none                         Calf Tenderness - none    Assessment:    31 y.o. G 1 P 1001      POD # 1 S/P Primary C/S for Failure to Dilate & Abnormal Fetal Status,  in stable condition.    PMHx significant for:  Knee Surgery, Fibroids  Current Issues:  None  Plan:  Increase OOB             Cont. Pain Protocol             CBC as above             Cont. Reg. Diet             Cont. PO Care.            Cont. DVT PPx    FAHAD Castillo

## 2023-01-24 NOTE — ED ADULT NURSE NOTE - NS ED NURSE LEVEL OF CONSCIOUSNESS MENTAL STATUS
Quality 130: Documentation Of Current Medications In The Medical Record: Current Medications Documented Quality 431: Preventive Care And Screening: Unhealthy Alcohol Use - Screening: Patient not identified as an unhealthy alcohol user when screened for unhealthy alcohol use using a systematic screening method Quality 402: Tobacco Use And Help With Quitting Among Adolescents: Patient screened for tobacco and never smoked Quality 110: Preventive Care And Screening: Influenza Immunization: Influenza Immunization Administered during Influenza season Quality 226: Preventive Care And Screening: Tobacco Use: Screening And Cessation Intervention: Patient screened for tobacco use and is an ex/non-smoker Awake/Alert/Cooperative Detail Level: Detailed

## 2023-02-06 ENCOUNTER — NON-APPOINTMENT (OUTPATIENT)
Age: 33
End: 2023-02-06

## 2023-02-06 ENCOUNTER — APPOINTMENT (OUTPATIENT)
Dept: DERMATOLOGY | Facility: CLINIC | Age: 33
End: 2023-02-06
Payer: COMMERCIAL

## 2023-02-06 VITALS — HEIGHT: 65 IN | BODY MASS INDEX: 38.32 KG/M2 | WEIGHT: 230 LBS

## 2023-02-06 PROBLEM — Z00.00 ENCOUNTER FOR PREVENTIVE HEALTH EXAMINATION: Noted: 2023-02-06

## 2023-02-06 PROCEDURE — 99204 OFFICE O/P NEW MOD 45 MIN: CPT

## 2023-02-15 PROBLEM — Z78.9 OTHER SPECIFIED HEALTH STATUS: Chronic | Status: ACTIVE | Noted: 2022-09-04

## 2023-02-16 NOTE — OB RN PATIENT PROFILE - PRO PRENATAL LABS ORI SOURCE VDRL/RPR
[facility]        NAME: Shawnee Pritchard  is a 78 y o  male  : 1944    MRN: [id number]  DATE: 2023  TIME: 11:27 AM    Assessment and Plan   Puncture wound [T14  8XXA]  1  Puncture wound  XR hand 3+ vw right    cefTRIAXone (ROCEPHIN) injection 1,000 mg    cephalexin (KEFLEX) 500 mg capsule    lidocaine (XYLOCAINE) 1 % injection 2 1 mL            Patient Instructions   There are no Patient Instructions on file for this visit  Follow up with PCP in 3-5 days  Proceed to  ER if symptoms worsen  Chief Complaint     Chief Complaint   Patient presents with   • Puncture Wound     Puncture wound of rt hand x 2 days awgo   Has edematous reddened rt hand  TDAP           History of Present Illness       The pt is an 78-year-old male presenting today for a puncture wound from a nail to his R hand 1 day ago  The hand is now swollen and red  He has limited range of motion secondary to swelling  Last Tdap was in   No fevers, chills, nausea or vomiting  No lightheadedness or dizziness  He believes the nail was julito  Review of Systems   Review of Systems   Constitutional: Negative for activity change, appetite change, chills, diaphoresis and fever  Musculoskeletal: Positive for joint swelling and myalgias  Negative for arthralgias  Skin: Positive for color change and wound  Negative for pallor           Current Medications       Current Outpatient Medications:   •  Aspirin-Acetaminophen-Caffeine (EXCEDRIN EXTRA STRENGTH PO), Take 500 mg by mouth every morning , Disp: , Rfl:   •  cephalexin (KEFLEX) 500 mg capsule, Take 1 capsule (500 mg total) by mouth every 12 (twelve) hours for 7 days, Disp: 14 capsule, Rfl: 0  •  pantoprazole (PROTONIX) 40 mg tablet, Take 40 mg by mouth every morning , Disp: , Rfl:   •  simvastatin (ZOCOR) 20 mg tablet, Take 20 mg by mouth every morning , Disp: , Rfl:   •  tamsulosin (FLOMAX) 0 4 mg, Take 0 8 mg by mouth (Patient not taking: Reported on 7/26/2022), Disp: , Rfl:     Current Facility-Administered Medications:   •  cefTRIAXone (ROCEPHIN) injection 1,000 mg, 1,000 mg, Intramuscular, Once, Bryan Mary PA-C  •  lidocaine (XYLOCAINE) 1 % injection 2 1 mL, 2 1 mL, Injection, Once, Bryan Mary PA-C    Current Allergies     Allergies as of 02/16/2023   • (No Known Allergies)            The following portions of the patient's history were reviewed and updated as appropriate: allergies, current medications, past family history, past medical history, past social history, past surgical history and problem list      Past Medical History:   Diagnosis Date   • Acid reflux    • Cervical arthritis    • DDD (degenerative disc disease), lumbar    • GERD (gastroesophageal reflux disease)    • History of BPH    • Hyperlipidemia    • Hypertension        Past Surgical History:   Procedure Laterality Date   • HERNIA REPAIR      epigastric hernia x3   • JOINT REPLACEMENT Left 2016    hip   • CO XCAPSL CTRC RMVL INSJ IO LENS PROSTH W/O ECP Left 6/6/2016    Procedure: EXTRACTION EXTRACAPSULAR CATARACT PHACO INTRAOCULAR LENS (IOL); Surgeon: Meron Mcdowell MD;  Location: [facility] OR;  Service: Ophthalmology   • CO XCAPSL CTRC RMVL INSJ IO LENS PROSTH W/O ECP Right 11/16/2020    Procedure: EXTRACTION EXTRACAPSULAR CATARACT PHACO INTRAOCULAR LENS (IOL); Surgeon: Meron Mcdowell MD;  Location: [facility] OR;  Service: Ophthalmology   • PROSTATE SURGERY  2014   • SHOULDER ARTHROSCOPY W/ ROTATOR CUFF REPAIR Left 1997   • US GUIDED VASCULAR ACCESS  9/4/2018       Family History   Family history unknown: Yes         Medications have been verified  Objective   /90   Pulse 88   Temp 97 5 °F (36 4 °C)   Resp 20   Ht 5' 10" (1 778 m)   Wt 68 kg (150 lb)   SpO2 99%   BMI 21 52 kg/m²        Physical Exam     Physical Exam  Vitals and nursing note reviewed  Constitutional:       General: He is not in acute distress  Appearance: Normal appearance   He is normal weight  He is not ill-appearing, toxic-appearing or diaphoretic  HENT:      Head: Normocephalic and atraumatic  Cardiovascular:      Rate and Rhythm: Normal rate and regular rhythm  Heart sounds: Normal heart sounds  No murmur heard  No friction rub  No gallop  Pulmonary:      Effort: Pulmonary effort is normal  No respiratory distress  Breath sounds: Normal breath sounds  No stridor  No wheezing, rhonchi or rales  Chest:      Chest wall: No tenderness  Musculoskeletal:         General: Swelling present  Cervical back: Normal range of motion  Lymphadenopathy:      Cervical: No cervical adenopathy  Skin:     General: Skin is warm and dry  Capillary Refill: Capillary refill takes less than 2 seconds  Findings: Erythema and wound present  No bruising or ecchymosis  Neurological:      Mental Status: He is alert  hard copy, drawn during this pregnancy

## 2023-03-23 ENCOUNTER — APPOINTMENT (OUTPATIENT)
Dept: ORTHOPEDIC SURGERY | Facility: CLINIC | Age: 33
End: 2023-03-23
Payer: OTHER MISCELLANEOUS

## 2023-03-23 VITALS — WEIGHT: 230 LBS | HEIGHT: 65 IN | BODY MASS INDEX: 38.32 KG/M2

## 2023-03-23 VITALS — HEIGHT: 65 IN | WEIGHT: 230 LBS | BODY MASS INDEX: 38.32 KG/M2

## 2023-03-23 DIAGNOSIS — M54.16 RADICULOPATHY, LUMBAR REGION: ICD-10-CM

## 2023-03-23 PROCEDURE — 72100 X-RAY EXAM L-S SPINE 2/3 VWS: CPT

## 2023-03-23 PROCEDURE — 72040 X-RAY EXAM NECK SPINE 2-3 VW: CPT

## 2023-03-23 PROCEDURE — 99215 OFFICE O/P EST HI 40 MIN: CPT

## 2023-04-09 ENCOUNTER — FORM ENCOUNTER (OUTPATIENT)
Age: 33
End: 2023-04-09

## 2023-04-11 ENCOUNTER — APPOINTMENT (OUTPATIENT)
Dept: ORTHOPEDIC SURGERY | Facility: CLINIC | Age: 33
End: 2023-04-11

## 2023-04-17 ENCOUNTER — APPOINTMENT (OUTPATIENT)
Dept: ORTHOPEDIC SURGERY | Facility: CLINIC | Age: 33
End: 2023-04-17

## 2023-04-19 ENCOUNTER — APPOINTMENT (OUTPATIENT)
Dept: ORTHOPEDIC SURGERY | Facility: CLINIC | Age: 33
End: 2023-04-19
Payer: OTHER MISCELLANEOUS

## 2023-04-19 PROCEDURE — 99442: CPT | Mod: 95

## 2023-05-01 ENCOUNTER — APPOINTMENT (OUTPATIENT)
Dept: ORTHOPEDIC SURGERY | Facility: CLINIC | Age: 33
End: 2023-05-01
Payer: OTHER MISCELLANEOUS

## 2023-05-01 VITALS — WEIGHT: 230 LBS | HEIGHT: 65 IN | BODY MASS INDEX: 38.32 KG/M2

## 2023-05-01 DIAGNOSIS — M54.12 RADICULOPATHY, CERVICAL REGION: ICD-10-CM

## 2023-05-01 PROCEDURE — 99214 OFFICE O/P EST MOD 30 MIN: CPT

## 2023-06-05 NOTE — ED ADULT NURSE NOTE - NS ED NURSE LEVEL OF CONSCIOUSNESS ORIENTATION
Left voice message for Kandy @ [phone]  to give our office a call back at [phone].     
Oriented - self; Oriented - place; Oriented - time

## 2023-06-29 ENCOUNTER — APPOINTMENT (OUTPATIENT)
Dept: ORTHOPEDIC SURGERY | Facility: CLINIC | Age: 33
End: 2023-06-29
Payer: OTHER MISCELLANEOUS

## 2023-06-29 VITALS — WEIGHT: 230 LBS | BODY MASS INDEX: 38.32 KG/M2 | HEIGHT: 65 IN

## 2023-06-29 DIAGNOSIS — M51.36 OTHER INTERVERTEBRAL DISC DEGENERATION, LUMBAR REGION: ICD-10-CM

## 2023-06-29 DIAGNOSIS — M50.30 OTHER CERVICAL DISC DEGENERATION, UNSPECIFIED CERVICAL REGION: ICD-10-CM

## 2023-06-29 PROCEDURE — 99214 OFFICE O/P EST MOD 30 MIN: CPT

## 2023-06-29 RX ORDER — LIDOCAINE 5% 700 MG/1
5 PATCH TOPICAL
Qty: 60 | Refills: 1 | Status: ACTIVE | COMMUNITY
Start: 2023-06-29 | End: 1900-01-01

## 2023-06-29 RX ORDER — DICLOFENAC SODIUM 16.05 MG/ML
1.5 SOLUTION TOPICAL
Qty: 1 | Refills: 1 | Status: ACTIVE | COMMUNITY
Start: 2023-06-29 | End: 1900-01-01

## 2023-06-29 NOTE — DISCUSSION/SUMMARY
[de-identified] : 31 yo female with cervical and lumbar radiculopathy, with DDD, failed conservative management and care, PT, tylenol, recommend pain consult.\par \par \par Diagnosis, prognosis, natural history and treatment was discussed with patient. Patient was advised if the following symptoms develop: chills, fever, \par loss of bladder control, bowel incontinence or urinary retention, numbness/tingling or weakness is present in upper or lower extremities, to go to the nearest emergency room. This may be a new clinical condition not present at the time of the patient visit  that may lead to paralysis and/or death, Patient advised if the above symptoms developed to also call the office immediately to inform us and to go to the nearest emergency room.\par \par

## 2023-06-29 NOTE — HISTORY OF PRESENT ILLNESS
[de-identified] : Pt with cervical and lumbar radiculopathy,  with DDD, failed conservative management and care. Pt was injured at work on 08/22/2019(ready refresh) and on  Sept 17th 2021(MTA injury).  Pt presents today for f/u , symptoms stable.  Pain is described as sharp, burning, stabbing pain. Pt states she has stiffness of her C spine. Neck pain radiates to B/L shoulder and B/l scapula, B/L UE to fingers, R>L, associated with tingling numbness on B/L UE. Pain radiates to B/L buttocks, B/L hips and anterior/posterior aspect of B/L LE to ankle, R>L associated with numbness,tingling and weakness on B/L LE, R>L. Pt is not taking pain meds at this tie as she is breastfeeding, but may take Tylenol PRN this provides no pain relief. Pt completed PT 04/2021, this provided  some pain relief, but then stopped as this was not approved by . also had chiropractor care but this was not approved by .  Pt had trigger point injections in 2021, this provided no relief in pain. Pt denies fever, chills, weight changes, loss of bladder control, bowel incontinence or urinary retention or saddle anesthesia  [FreeTextEntry1] : worsened by bending,  prolonged sitting/standing walking, recumbency exacerbates pain. \par PT. tylenol provides mild pain relief

## 2023-06-29 NOTE — PHYSICAL EXAM
[Motor Strength Upper Extremities] : right (5/5) [NL] : Motor strength of the left lower extremity was normal [Motor Strength Lower Extremities] : right (weak) [] : Sensory: [C6-RT] : C6 [C7-RT] : C7 [C8-RT] : C8 [L4-RT] : L4 [L5-RT] : L5 [1+] : right patella 1+ [2+] : left patella 2+ [ALL] : dorsalis pedis, posterior tibial, femoral, popliteal, and radial 2+ and symmetric bilaterally [Normal] : Oriented to person, place, and time, insight and judgement were intact and the affect was normal [Cm's Sign] : negative Cm's sign [Pronator Drift] : negative pronator drift [SLR] : negative straight leg raise [de-identified] : Cervical ROM: limited, painful\par Lumbar ROM:  limited, painful\par TTP C/T/L spine, b/l paracervicals and b/l paraspinals L region R>L. \par Skin intact C/T/L spine. No rashes, ulcers, blisters. \par No lymphedema. \par  [de-identified] : C spine MRI [facility] radiology 04/24/23\par \par EXAM:  MRI CERVICAL SPINE WITHOUT CONTRAST\par \par HISTORY:  Neck pain.\par \par TECHNIQUE:  MR imaging of the cervical spine performed with axial gradient echo and T2 fat suppressed as well as sagittal T1, T2 and STIR sequences. No contrast.\par \par COMPARISON:  None.\par \par FINDINGS:  \par \par POSTERIOR FOSSA AND CERVICAL SOFT TISSUES: \par Posterior fossa structures demonstrate normal appearance without evidence for herniation. \par Paraspinal musculature demonstrates symmetric size and signal. \par No superficial soft tissue abnormalities identified. \par Normal appearance to the vascular structures. No evidence for cervical adenopathy.\par \par OSSEOUS STRUCTURES: \par Vertebral bodies demonstrate grossly normal marrow signal.\par Straightening of the normal cervical lordosis. \par No evidence for fracture or spondylolisthesis.\par No evidence for aggressive osseous lesions appreciated. \par \par C2-C3 level demonstrates no disc bulge or herniation. No foraminal impingement or canal stenosis.\par \par C3-C4 level demonstrates a disc bulge causing impingement upon the anterior thecal sac and cord. Bilateral lateral recess impingement.\par \par C4-C5 level demonstrates a disc bulge causing impingement upon the anterior thecal sac and cord. Right foraminal impingement.\par \par C5-C6 level demonstrates a disc bulge causing impingement upon the anterior thecal sac.\par \par C6-C7 level demonstrates a disc bulge causing impingement upon the anterior thecal sac.\par \par C7-T1 level demonstrates no disc bulge or herniation. No foraminal impingement or canal stenosis.\par \par IMPRESSION:  \par 1.  C3-C4 level demonstrates a disc bulge causing impingement upon the anterior thecal sac and cord. Bilateral lateral recess impingement.\par 2.  C4-C5 level demonstrates a disc bulge causing impingement upon the anterior thecal sac and cord. Right foraminal impingement.\par 3.  C5-C6 level demonstrates a disc bulge causing impingement upon the anterior thecal sac.\par 4.  C6-C7 level demonstrates a disc bulge causing impingement upon the anterior thecal sac.\par 5.  Straightening of the cervical lordosis indicative of muscular spasm.\par \par Thank you for the opportunity to participate in the care of this patient.  \par  \par \par \par \par L spine MRI [facility] radiology 03/31/23\par EXAM:  MRI LUMBAR SPINE WITHOUT CONTRAST\par \par HISTORY: Lower back pain.\par \par TECHNIQUE: Multiplanar, multi-sequential MRI of the lumbar spine was obtained on a 3T scanner using a standard protocol.\par \par COMPARISON:  None available.\par \par FINDINGS:\par \par For purposes of this dictation, the last well-formed disc space will be labeled L5-S1.\par \par ALIGNMENT: Straightening of the normal lumbar lordosis may reflect muscle spasm or patient positioning. Mild retrolisthesis of L5 over S1.\par \par \par OSSEOUS STRUCTURES: Vertebral body heights are maintained. No acute fractures are identified. Marrow signal is unremarkable. \par \par DISCS: Mild disc desiccation at L3-L4 through L5-S1. Otherwise, intervertebral discs are maintained in height and hydration.\par \par \par SPINAL CORD, CONUS MEDULLARIS AND SPINAL CANAL: Unremarkable.\par \par \par PARASPINAL AND INTRA-ABDOMINAL SOFT TISSUES: Enlarged myomatous uterus.\par \par \par \par \par The following axial levels are imaged and detailed below:\par \par \par L1-L2: No spinal canal or neuroforaminal stenosis.\par \par \par L2-L3: No spinal canal or neuroforaminal stenosis.\par \par \par L3-L4: Minimal right foraminal disc protrusion. No spinal canal or neuroforaminal stenosis.\par \par \par L4-L5: Minimal right foraminal disc protrusion. No spinal canal or neuroforaminal stenosis.\par \par \par L5-S1: Mild retrolisthesis of L5 over S1. Minimal disc bulge. No spinal canal or neuroforaminal stenosis.\par \par IMPRESSION:  MRI of the lumbar spine demonstrates:\par \par 1.  Straightening of the normal lumbar lordosis may reflect muscle spasm or patient positioning.\par 2.  Mild retrolisthesis of L5 over S1.\par 3.  Minimal right foraminal disc protrusions at L3-L4 and L4-L5.\par 4.  Minimal disc bulge at L5-S1.\par \par \par \par \par \par 2 views AP and Lat of Cervical Spine from occipital to C7/T1 and  LS Spine from [id number] 03/23/23. Read and dictated by Dr. Elkin Park Board Certified orthopaedic surgeon C5-C7 DDD, L4-S1 DDD\par \par \par 2 views AP and Lat of LS Spine from [id number] 04/19/21. Read and dictated by Dr. Elkin Park Board Certified orthopaedic surgeon \par \par \par \par 2 views AP and Lat of Cervical Spine from occipital to C7/T1, thoracic Spine from T1-T12 and  LS Spine from [id number] 05/20/20.  Read and dictated by Dr. Elkin Park Board Certified orthopaedic surgeon Good alignment , no instability, minor degenerative changes\par \par MRI report as per patient L45 was level of abnormality\par

## 2023-07-27 NOTE — OB RN TRIAGE NOTE - NSLDARRIVAL_OBGYN_ALL_OB_DIFF_HHMM
I attest my time as attending is greater than 50% of the total combined time spent on qualifying patient care activities by the PA/NP and attending. I attest my time as attending is greater than 50% of the total combined time spent on qualifying patient care activities by the PA/NP and attending. 1 Hour(s) 23 Minute(s)

## 2023-08-08 NOTE — ED PROVIDER NOTE - CPE EDP GASTRO NORM
external auditory canal filled with lidocaine 4%, attempted irrigation but pt unable to tolerate normal...

## 2023-09-18 ENCOUNTER — NON-APPOINTMENT (OUTPATIENT)
Age: 33
End: 2023-09-18

## 2024-04-09 NOTE — OB RN TRIAGE NOTE - FALL HARM RISK - CONCLUSION
s/p took tylenol 6000mg about 35 mins ago, Suicidal. Pt has been depressed lost 2 family members in the past 3 months. Hx of anxiety Universal Safety Interventions

## 2024-07-19 ENCOUNTER — APPOINTMENT (OUTPATIENT)
Dept: NEUROLOGY | Facility: CLINIC | Age: 34
End: 2024-07-19

## 2024-12-04 ENCOUNTER — INPATIENT (INPATIENT)
Facility: HOSPITAL | Age: 34
LOS: 1 days | Discharge: HOME | End: 2024-12-06
Attending: STUDENT IN AN ORGANIZED HEALTH CARE EDUCATION/TRAINING PROGRAM | Admitting: STUDENT IN AN ORGANIZED HEALTH CARE EDUCATION/TRAINING PROGRAM
Payer: COMMERCIAL

## 2024-12-04 VITALS
WEIGHT: 220.02 LBS | HEART RATE: 73 BPM | HEIGHT: 65 IN | TEMPERATURE: 98 F | DIASTOLIC BLOOD PRESSURE: 84 MMHG | RESPIRATION RATE: 20 BRPM | OXYGEN SATURATION: 98 % | SYSTOLIC BLOOD PRESSURE: 146 MMHG

## 2024-12-04 DIAGNOSIS — Z98.890 OTHER SPECIFIED POSTPROCEDURAL STATES: Chronic | ICD-10-CM

## 2024-12-04 DIAGNOSIS — M50.20 OTHER CERVICAL DISC DISPLACEMENT, UNSPECIFIED CERVICAL REGION: Chronic | ICD-10-CM

## 2024-12-04 LAB
ALBUMIN SERPL ELPH-MCNC: 3.8 G/DL — SIGNIFICANT CHANGE UP (ref 3.3–5)
ALP SERPL-CCNC: 108 U/L — SIGNIFICANT CHANGE UP (ref 40–120)
ALT FLD-CCNC: 27 U/L — SIGNIFICANT CHANGE UP (ref 12–78)
ANION GAP SERPL CALC-SCNC: 5 MMOL/L — SIGNIFICANT CHANGE UP (ref 5–17)
AST SERPL-CCNC: 14 U/L — LOW (ref 15–37)
BASOPHILS # BLD AUTO: 0.04 K/UL — SIGNIFICANT CHANGE UP (ref 0–0.2)
BASOPHILS NFR BLD AUTO: 0.3 % — SIGNIFICANT CHANGE UP (ref 0–2)
BILIRUB SERPL-MCNC: 0.5 MG/DL — SIGNIFICANT CHANGE UP (ref 0.2–1.2)
BUN SERPL-MCNC: 10 MG/DL — SIGNIFICANT CHANGE UP (ref 7–23)
CALCIUM SERPL-MCNC: 9.2 MG/DL — SIGNIFICANT CHANGE UP (ref 8.5–10.1)
CHLORIDE SERPL-SCNC: 107 MMOL/L — SIGNIFICANT CHANGE UP (ref 96–108)
CO2 SERPL-SCNC: 28 MMOL/L — SIGNIFICANT CHANGE UP (ref 22–31)
CREAT SERPL-MCNC: 0.84 MG/DL — SIGNIFICANT CHANGE UP (ref 0.5–1.3)
EGFR: 92 ML/MIN/1.73M2 — SIGNIFICANT CHANGE UP
EOSINOPHIL # BLD AUTO: 0.1 K/UL — SIGNIFICANT CHANGE UP (ref 0–0.5)
EOSINOPHIL NFR BLD AUTO: 0.8 % — SIGNIFICANT CHANGE UP (ref 0–6)
GLUCOSE SERPL-MCNC: 98 MG/DL — SIGNIFICANT CHANGE UP (ref 70–99)
HCG SERPL-ACNC: <1 MIU/ML — SIGNIFICANT CHANGE UP
HCT VFR BLD CALC: 38.6 % — SIGNIFICANT CHANGE UP (ref 34.5–45)
HGB BLD-MCNC: 12.3 G/DL — SIGNIFICANT CHANGE UP (ref 11.5–15.5)
IMM GRANULOCYTES NFR BLD AUTO: 0.3 % — SIGNIFICANT CHANGE UP (ref 0–0.9)
LIDOCAIN IGE QN: 23 U/L — SIGNIFICANT CHANGE UP (ref 13–75)
LYMPHOCYTES # BLD AUTO: 17.5 % — SIGNIFICANT CHANGE UP (ref 13–44)
LYMPHOCYTES # BLD AUTO: 2.3 K/UL — SIGNIFICANT CHANGE UP (ref 1–3.3)
MCHC RBC-ENTMCNC: 28.2 PG — SIGNIFICANT CHANGE UP (ref 27–34)
MCHC RBC-ENTMCNC: 31.9 G/DL — LOW (ref 32–36)
MCV RBC AUTO: 88.5 FL — SIGNIFICANT CHANGE UP (ref 80–100)
MONOCYTES # BLD AUTO: 0.74 K/UL — SIGNIFICANT CHANGE UP (ref 0–0.9)
MONOCYTES NFR BLD AUTO: 5.6 % — SIGNIFICANT CHANGE UP (ref 2–14)
NEUTROPHILS # BLD AUTO: 9.89 K/UL — HIGH (ref 1.8–7.4)
NEUTROPHILS NFR BLD AUTO: 75.5 % — SIGNIFICANT CHANGE UP (ref 43–77)
NRBC # BLD: 0 /100 WBCS — SIGNIFICANT CHANGE UP (ref 0–0)
PLATELET # BLD AUTO: 332 K/UL — SIGNIFICANT CHANGE UP (ref 150–400)
POTASSIUM SERPL-MCNC: 3.5 MMOL/L — SIGNIFICANT CHANGE UP (ref 3.5–5.3)
POTASSIUM SERPL-SCNC: 3.5 MMOL/L — SIGNIFICANT CHANGE UP (ref 3.5–5.3)
PROT SERPL-MCNC: 7.7 GM/DL — SIGNIFICANT CHANGE UP (ref 6–8.3)
RBC # BLD: 4.36 M/UL — SIGNIFICANT CHANGE UP (ref 3.8–5.2)
RBC # FLD: 13.1 % — SIGNIFICANT CHANGE UP (ref 10.3–14.5)
SODIUM SERPL-SCNC: 140 MMOL/L — SIGNIFICANT CHANGE UP (ref 135–145)
WBC # BLD: 13.11 K/UL — HIGH (ref 3.8–10.5)
WBC # FLD AUTO: 13.11 K/UL — HIGH (ref 3.8–10.5)

## 2024-12-04 PROCEDURE — 99285 EMERGENCY DEPT VISIT HI MDM: CPT

## 2024-12-04 RX ORDER — KETOROLAC TROMETHAMINE 30 MG/ML
15 INJECTION INTRAMUSCULAR; INTRAVENOUS ONCE
Refills: 0 | Status: DISCONTINUED | OUTPATIENT
Start: 2024-12-04 | End: 2024-12-04

## 2024-12-04 RX ORDER — ONDANSETRON HYDROCHLORIDE 4 MG/1
4 TABLET, FILM COATED ORAL ONCE
Refills: 0 | Status: COMPLETED | OUTPATIENT
Start: 2024-12-04 | End: 2024-12-04

## 2024-12-04 RX ORDER — SODIUM CHLORIDE 9 MG/ML
1000 INJECTION, SOLUTION INTRAMUSCULAR; INTRAVENOUS; SUBCUTANEOUS ONCE
Refills: 0 | Status: COMPLETED | OUTPATIENT
Start: 2024-12-04 | End: 2024-12-04

## 2024-12-04 RX ADMIN — KETOROLAC TROMETHAMINE 15 MILLIGRAM(S): 30 INJECTION INTRAMUSCULAR; INTRAVENOUS at 23:44

## 2024-12-04 RX ADMIN — ONDANSETRON HYDROCHLORIDE 4 MILLIGRAM(S): 4 TABLET, FILM COATED ORAL at 21:11

## 2024-12-04 RX ADMIN — KETOROLAC TROMETHAMINE 15 MILLIGRAM(S): 30 INJECTION INTRAMUSCULAR; INTRAVENOUS at 21:11

## 2024-12-04 RX ADMIN — KETOROLAC TROMETHAMINE 15 MILLIGRAM(S): 30 INJECTION INTRAMUSCULAR; INTRAVENOUS at 22:00

## 2024-12-04 RX ADMIN — SODIUM CHLORIDE 1000 MILLILITER(S): 9 INJECTION, SOLUTION INTRAMUSCULAR; INTRAVENOUS; SUBCUTANEOUS at 20:37

## 2024-12-04 NOTE — ED ADULT NURSE NOTE - CHIEF COMPLAINT
Chief Complaint   Patient presents with     Consult     Referred from ED     Nose Problem     Fractures of the nasal bones displaced to the left       Initial Pulse 91  Wt 91.6 kg (202 lb)  SpO2 96% There is no height or weight on file to calculate BMI.  Medication Reconciliation: complete  
The patient is a 34y Female complaining of abdominal pain.

## 2024-12-04 NOTE — ED ADULT NURSE NOTE - OBJECTIVE STATEMENT
Pt c/o RUQ abdominal pain for "months on and off". Pt denies cp, difficulty breathing, SOB, nausea vomiting diarrhea, fever or chills at this time. No pmhx. NKDA

## 2024-12-04 NOTE — ED ADULT NURSE NOTE - ED STAT RN HANDOFF DETAILS
handoff report given to 1 c rn theresa. All pending orders endorsed, safety precautions maintained. Pt updated on plan of care.

## 2024-12-04 NOTE — ED ADULT NURSE NOTE - NSICDXPASTMEDICALHX_GEN_ALL_CORE_FT
PAST MEDICAL HISTORY:  ACL (anterior cruciate ligament) tear     Herniated disc, cervical     No pertinent past medical history     No pertinent past medical history     Sciatica of right side

## 2024-12-05 ENCOUNTER — RESULT REVIEW (OUTPATIENT)
Age: 34
End: 2024-12-05

## 2024-12-05 ENCOUNTER — TRANSCRIPTION ENCOUNTER (OUTPATIENT)
Age: 34
End: 2024-12-05

## 2024-12-05 LAB
ABO RH CONFIRMATION: SIGNIFICANT CHANGE UP
ALBUMIN SERPL ELPH-MCNC: 3.1 G/DL — LOW (ref 3.3–5)
ALP SERPL-CCNC: 84 U/L — SIGNIFICANT CHANGE UP (ref 40–120)
ALT FLD-CCNC: 20 U/L — SIGNIFICANT CHANGE UP (ref 12–78)
ANION GAP SERPL CALC-SCNC: 4 MMOL/L — LOW (ref 5–17)
APTT BLD: 32.8 SEC — SIGNIFICANT CHANGE UP (ref 24.5–35.6)
AST SERPL-CCNC: 6 U/L — LOW (ref 15–37)
BILIRUB DIRECT SERPL-MCNC: 0.1 MG/DL — SIGNIFICANT CHANGE UP (ref 0–0.3)
BILIRUB INDIRECT FLD-MCNC: 0.6 MG/DL — SIGNIFICANT CHANGE UP (ref 0.2–1)
BILIRUB SERPL-MCNC: 0.7 MG/DL — SIGNIFICANT CHANGE UP (ref 0.2–1.2)
BLD GP AB SCN SERPL QL: SIGNIFICANT CHANGE UP
BUN SERPL-MCNC: 10 MG/DL — SIGNIFICANT CHANGE UP (ref 7–23)
CALCIUM SERPL-MCNC: 8.5 MG/DL — SIGNIFICANT CHANGE UP (ref 8.5–10.1)
CHLORIDE SERPL-SCNC: 110 MMOL/L — HIGH (ref 96–108)
CO2 SERPL-SCNC: 27 MMOL/L — SIGNIFICANT CHANGE UP (ref 22–31)
CREAT SERPL-MCNC: 0.83 MG/DL — SIGNIFICANT CHANGE UP (ref 0.5–1.3)
EGFR: 94 ML/MIN/1.73M2 — SIGNIFICANT CHANGE UP
GLUCOSE SERPL-MCNC: 85 MG/DL — SIGNIFICANT CHANGE UP (ref 70–99)
HCG SERPL-ACNC: <1 MIU/ML — SIGNIFICANT CHANGE UP
HCT VFR BLD CALC: 35.9 % — SIGNIFICANT CHANGE UP (ref 34.5–45)
HGB BLD-MCNC: 11.1 G/DL — LOW (ref 11.5–15.5)
INR BLD: 0.99 RATIO — SIGNIFICANT CHANGE UP (ref 0.85–1.16)
MAGNESIUM SERPL-MCNC: 2 MG/DL — SIGNIFICANT CHANGE UP (ref 1.6–2.6)
MCHC RBC-ENTMCNC: 28.2 PG — SIGNIFICANT CHANGE UP (ref 27–34)
MCHC RBC-ENTMCNC: 30.9 G/DL — LOW (ref 32–36)
MCV RBC AUTO: 91.3 FL — SIGNIFICANT CHANGE UP (ref 80–100)
NRBC # BLD: 0 /100 WBCS — SIGNIFICANT CHANGE UP (ref 0–0)
PHOSPHATE SERPL-MCNC: 2.9 MG/DL — SIGNIFICANT CHANGE UP (ref 2.5–4.5)
PLATELET # BLD AUTO: 276 K/UL — SIGNIFICANT CHANGE UP (ref 150–400)
POTASSIUM SERPL-MCNC: 4 MMOL/L — SIGNIFICANT CHANGE UP (ref 3.5–5.3)
POTASSIUM SERPL-SCNC: 4 MMOL/L — SIGNIFICANT CHANGE UP (ref 3.5–5.3)
PROT SERPL-MCNC: 6.8 GM/DL — SIGNIFICANT CHANGE UP (ref 6–8.3)
PROTHROM AB SERPL-ACNC: 11.5 SEC — SIGNIFICANT CHANGE UP (ref 9.9–13.4)
RBC # BLD: 3.93 M/UL — SIGNIFICANT CHANGE UP (ref 3.8–5.2)
RBC # FLD: 13.4 % — SIGNIFICANT CHANGE UP (ref 10.3–14.5)
SODIUM SERPL-SCNC: 141 MMOL/L — SIGNIFICANT CHANGE UP (ref 135–145)
WBC # BLD: 10.83 K/UL — HIGH (ref 3.8–10.5)
WBC # FLD AUTO: 10.83 K/UL — HIGH (ref 3.8–10.5)

## 2024-12-05 PROCEDURE — 88304 TISSUE EXAM BY PATHOLOGIST: CPT | Mod: 26

## 2024-12-05 PROCEDURE — 47562 LAPAROSCOPIC CHOLECYSTECTOMY: CPT

## 2024-12-05 PROCEDURE — 47562 LAPAROSCOPIC CHOLECYSTECTOMY: CPT | Mod: AS

## 2024-12-05 PROCEDURE — 99223 1ST HOSP IP/OBS HIGH 75: CPT | Mod: 57

## 2024-12-05 PROCEDURE — 99284 EMERGENCY DEPT VISIT MOD MDM: CPT | Mod: 25

## 2024-12-05 PROCEDURE — 76705 ECHO EXAM OF ABDOMEN: CPT | Mod: 26

## 2024-12-05 DEVICE — LIGATING CLIPS WECK HEMOLOK POLYMER MEDIUM-LARGE (GREEN) 6: Type: IMPLANTABLE DEVICE | Status: FUNCTIONAL

## 2024-12-05 DEVICE — CLIP APPLIER COVIDIEN ENDOCLIP III 5MM: Type: IMPLANTABLE DEVICE | Status: FUNCTIONAL

## 2024-12-05 RX ORDER — HYDROMORPHONE HYDROCHLORIDE 2 MG/1
1 TABLET ORAL
Refills: 0 | Status: DISCONTINUED | OUTPATIENT
Start: 2024-12-05 | End: 2024-12-05

## 2024-12-05 RX ORDER — HEPARIN SODIUM,PORCINE 1000/ML
5000 VIAL (ML) INJECTION EVERY 8 HOURS
Refills: 0 | Status: DISCONTINUED | OUTPATIENT
Start: 2024-12-05 | End: 2024-12-06

## 2024-12-05 RX ORDER — ACETAMINOPHEN 500MG 500 MG/1
1000 TABLET, COATED ORAL ONCE
Refills: 0 | Status: DISCONTINUED | OUTPATIENT
Start: 2024-12-05 | End: 2024-12-05

## 2024-12-05 RX ORDER — OXYCODONE HYDROCHLORIDE 30 MG/1
5 TABLET ORAL EVERY 4 HOURS
Refills: 0 | Status: DISCONTINUED | OUTPATIENT
Start: 2024-12-05 | End: 2024-12-06

## 2024-12-05 RX ORDER — 0.9 % SODIUM CHLORIDE 0.9 %
1000 INTRAVENOUS SOLUTION INTRAVENOUS
Refills: 0 | Status: DISCONTINUED | OUTPATIENT
Start: 2024-12-05 | End: 2024-12-06

## 2024-12-05 RX ORDER — METRONIDAZOLE 500 MG/1
500 TABLET ORAL EVERY 12 HOURS
Refills: 0 | Status: DISCONTINUED | OUTPATIENT
Start: 2024-12-05 | End: 2024-12-05

## 2024-12-05 RX ORDER — 0.9 % SODIUM CHLORIDE 0.9 %
1000 INTRAVENOUS SOLUTION INTRAVENOUS
Refills: 0 | Status: DISCONTINUED | OUTPATIENT
Start: 2024-12-05 | End: 2024-12-05

## 2024-12-05 RX ORDER — ONDANSETRON HYDROCHLORIDE 4 MG/1
4 TABLET, FILM COATED ORAL ONCE
Refills: 0 | Status: DISCONTINUED | OUTPATIENT
Start: 2024-12-05 | End: 2024-12-05

## 2024-12-05 RX ORDER — CEFTRIAXONE SODIUM 1 G
1000 VIAL (EA) INJECTION EVERY 24 HOURS
Refills: 0 | Status: DISCONTINUED | OUTPATIENT
Start: 2024-12-05 | End: 2024-12-05

## 2024-12-05 RX ORDER — HYDROMORPHONE HYDROCHLORIDE 2 MG/1
0.5 TABLET ORAL
Refills: 0 | Status: DISCONTINUED | OUTPATIENT
Start: 2024-12-05 | End: 2024-12-05

## 2024-12-05 RX ORDER — ONDANSETRON HYDROCHLORIDE 4 MG/1
4 TABLET, FILM COATED ORAL EVERY 6 HOURS
Refills: 0 | Status: DISCONTINUED | OUTPATIENT
Start: 2024-12-05 | End: 2024-12-06

## 2024-12-05 RX ORDER — ACETAMINOPHEN 500MG 500 MG/1
1000 TABLET, COATED ORAL ONCE
Refills: 0 | Status: COMPLETED | OUTPATIENT
Start: 2024-12-05 | End: 2024-12-06

## 2024-12-05 RX ORDER — ACETAMINOPHEN 500MG 500 MG/1
1000 TABLET, COATED ORAL ONCE
Refills: 0 | Status: COMPLETED | OUTPATIENT
Start: 2024-12-05 | End: 2024-12-05

## 2024-12-05 RX ADMIN — Medication 140 MILLILITER(S): at 21:41

## 2024-12-05 RX ADMIN — ACETAMINOPHEN 500MG 400 MILLIGRAM(S): 500 TABLET, COATED ORAL at 20:22

## 2024-12-05 RX ADMIN — Medication 5000 UNIT(S): at 21:42

## 2024-12-05 RX ADMIN — Medication 125 MILLILITER(S): at 20:22

## 2024-12-05 RX ADMIN — Medication 5000 UNIT(S): at 05:50

## 2024-12-05 RX ADMIN — METRONIDAZOLE 100 MILLIGRAM(S): 500 TABLET ORAL at 05:50

## 2024-12-05 RX ADMIN — Medication 140 MILLILITER(S): at 04:37

## 2024-12-05 RX ADMIN — ACETAMINOPHEN 500MG 1000 MILLIGRAM(S): 500 TABLET, COATED ORAL at 20:40

## 2024-12-05 RX ADMIN — Medication 5000 UNIT(S): at 15:20

## 2024-12-05 RX ADMIN — Medication 100 MILLIGRAM(S): at 04:31

## 2024-12-05 NOTE — PATIENT PROFILE ADULT - FALL HARM RISK - HARM RISK INTERVENTIONS
Assistance with ambulation/Communicate Risk of Fall with Harm to all staff/Reinforce activity limits and safety measures with patient and family/Tailored Fall Risk Interventions/Visual Cue: Yellow wristband and red socks/Bed in lowest position, wheels locked, appropriate side rails in place/Call bell, personal items and telephone in reach/Instruct patient to call for assistance before getting out of bed or chair/Non-slip footwear when patient is out of bed/[place] to call system/Physically safe environment - no spills, clutter or unnecessary equipment/Purposeful Proactive Rounding/Room/bathroom lighting operational, light cord in reach Assistance with ambulation/Assistance OOB with selected safe patient handling equipment/Communicate Risk of Fall with Harm to all staff/Reinforce activity limits and safety measures with patient and family/Tailored Fall Risk Interventions/Visual Cue: Yellow wristband and red socks/Bed in lowest position, wheels locked, appropriate side rails in place/Call bell, personal items and telephone in reach/Instruct patient to call for assistance before getting out of bed or chair/Non-slip footwear when patient is out of bed/[place] to call system/Physically safe environment - no spills, clutter or unnecessary equipment/Purposeful Proactive Rounding/Room/bathroom lighting operational, light cord in reach

## 2024-12-05 NOTE — ED PROVIDER NOTE - CLINICAL SUMMARY MEDICAL DECISION MAKING FREE TEXT BOX
34-year-old female with no reported significant past medical history presented to ED with complaints of right upper quadrant pain since yesterday with associated slight nausea, reported some chills, patient denies any reported fever, no reported diarrhea or changes in bowel, last bowel movement earlier today normal.  Patient denies any other complaints.  Patient exam notable for right upper quadrant tenderness, reports pain radiates around right flank and to the right shoulder, will obtain ultrasound of right upper quadrant to rule out cholecystitis, labs pain control and reassess. 34-year-old female with no reported significant past medical history presented to ED with complaints of right upper quadrant pain since yesterday with associated slight nausea, reported some chills, patient denies any reported fever, no reported diarrhea or changes in bowel, last bowel movement earlier today normal.  Patient denies any other complaints.  Patient exam notable for right upper quadrant tenderness, reports pain radiates around right flank and to the right shoulder, will obtain ultrasound of right upper quadrant to rule out cholecystitis, labs pain control and reassess.    surgery consulted   will admit for symptomatic cholelithiasis

## 2024-12-05 NOTE — H&P ADULT - ASSESSMENT
35 Y/O female with no PMHx and PSHx of c section in 2022 presents to the ED for abdominal pain. Admitted for acute cholecystitis. Will plan for robotic-assisted laparoscopic cholecystectomy Thursday. Afebrile with leukocytosis. US reveals CBD WNL and cholelithiasis.     PLAN:     - Admit to general surgery under Dr. Farley   - OR planning for robotic-assisted laparoscopic cholecystectomy   - NPO with IVF  - IV ABx   - DVT ppx; OOB/AAT   - Discussed with Dr. Farley

## 2024-12-05 NOTE — PATIENT PROFILE ADULT - FALL HARM RISK - FACTORS
IV and/or equipment tethered to patient IV and/or equipment tethered to patient/Other medical problems

## 2024-12-05 NOTE — H&P ADULT - NSHPPHYSICALEXAM_GEN_ALL_CORE
Physical Exam:  General:  Appears stated age, well-groomed, no distress  Eyes: EOMI, conjunctiva clear  HENT:  WNL, no JVD  Chest:  Clear breath sounds  Cardiovascular:  Regular rate & rhythm  Abdomen:  Obese abdomen, nondistended, + bowel sounds, RUQ TTP   Extremities:  No pedal edema or calf tenderness noted  Skin:  No rash  Musculoskeletal:  Normal strength  Neuro/Psych:  Alert, oriented to time, place and person   : No CVA or suprapubic tenderness

## 2024-12-05 NOTE — ED ADULT NURSE REASSESSMENT NOTE - NS ED NURSE REASSESS COMMENT FT1
Pt observed lying comfortably in stretcher at this time. Pt respirations equal and unlabored bilaterally. Nonverbal indicators of pain absent. WCTM.

## 2024-12-05 NOTE — PATIENT PROFILE ADULT - FUNCTIONAL ASSESSMENT - BASIC MOBILITY SECTION LABEL
. Oxybutynin Counseling:  I discussed with the patient the risks of oxybutynin including but not limited to skin rash, drowsiness, dry mouth, difficulty urinating, and blurred vision.

## 2024-12-05 NOTE — H&P ADULT - HISTORY OF PRESENT ILLNESS
35 Y/O female with no PMHx and PSHx of c section in 2022 presents to the ED for abdominal pain. States she has had multiple episodes over the last few months with more frequent recurrent attacks in the last week. States she has been experiencing abdominal pain radiating to the back with associated nausea/vomiting. States today the pain began this morning and has continued throughout the day which prompted her visit this evening. Denies fevers, chills, diarrhea, chest pain, SOB.

## 2024-12-05 NOTE — PATIENT PROFILE ADULT - LEGAL HELP
INFECTIOUS DISEASE INITIAL CONSULTATION     Reason for consult: Pneumonia    CHIEF COMPLAINT  Unable to obtain due to altered mental status    HISTORY OF PRESENT ILLNESS  Patient is a 73 yo M w/ PMH of cardiac arrest, anoxic brain injury, chronic respiratory failure s/p trach vent dependent, PAF, aspiration pneumonia, mild AI, hypothyroidism, HLD, PVD s/p left BKA and Right BKA, GERD, PE on Eliquis, MDRO, VRE and C auris colonization, ESRD on HD, CVA and recent admission for pneumonia (Received 2 days of avycaz and 5 days of cefepime) who presented on 11/3 from nursing home for respiratory distress and coarse breath sounds, in addition to BLE edema.     Since arrival to [facility], he has been hypothermic, trach to vent at 40% FiO2 and PEEP 5. CMP with Cr 2.13 and mildly elevated LFTs. WBC 10.2 (recently 7.7). Bcx in process. Negative for COVID/flu/RSV. CXR with LLL consolidation. CT chest with dense LLL consolidation. He was started on avycaz.    PAST MEDICAL HISTORY    Past Medical History:   Diagnosis Date   • Chronic kidney disease    • Diabetes mellitus (CMD)    • Stroke (CMD)    • Tracheostomy in place (CMD)     Initial tracheostomy tube placed 02-10-23       PAST SURGICAL HISTORY    No past surgical history on file.    MEDICATIONS  Current Facility-Administered Medications   Medication   • [START ON 11/4/2023] cefTAZidime-avibactam (AVYCAZ) 0.94 g in sodium chloride 0.9 % 100 mL IVPB     Current Outpatient Medications   Medication Sig Dispense Refill   • simethicone (MYLICON) 40 MG/0.6ML drops 1.2 mLs by Per G Tube route 2 times daily as needed (Gas). 45 mL 0   • acetaminophen (TYLENOL) 325 MG tablet 650 mg by Per G Tube route every 6 hours as needed for Pain or Fever.     • albuterol (VENTOLIN) (2.5 MG/3ML) 0.083% nebulizer solution Take 2.5 mg by nebulization every 6 hours.     • aspirin 81 MG chewable tablet 81 mg by Per G Tube route daily.     • atorvastatin (LIPITOR) 80 MG tablet 80 mg by Per G Tube route  at bedtime.     • bisacodyl (DULCOLAX) 10 MG suppository Place 10 mg rectally as needed for Constipation.     • apixaBAN (ELIQUIS) 5 MG Tab 5 mg by Per G Tube route every 12 hours.     • albuterol (VENTOLIN) (2.5 MG/3ML) 0.083% nebulizer solution Take 2.5 mg by nebulization as needed (SOB).     • vitamin B-12 (CYANOCOBALAMIN) 1000 MCG tablet 1,000 mcg by Per G Tube route daily.     • ezetimibe (ZETIA) 10 MG tablet 10 mg by Per G Tube route daily.     • Glycerin-Hypromellose- 0.2-0.2-1 % Solution Place 1 drop into both eyes 4 times daily.     • insulin regular, human, 100 UNIT/ML injectable solution Inject into the skin as directed. Inject as per sliding scale: if 201-250 = 2 units less than 70 call MD; 251-300 = 4 units; 301-350 = 6 units; 351-400 = 8 units, 401-450 = 10 units greater than 450 give 12 units sub q and notify MD, subcutaneously every 6 hours for blood sugar control.     • formoterol (PERFOROMIST) 20 MCG/2ML inhalation solution Take 20 mcg by nebulization in the morning and 20 mcg in the evening.     • chlorhexidine gluconate (PERIDEX) 0.12 % solution Take 15 mLs by mouth daily.     • levothyroxine 75 MCG tablet 225 mcg by Per G Tube route daily.     • magnesium hydroxide (MILK OF MAGNESIA) 400 MG/5ML suspension 30 mLs by Per G Tube route daily as needed for Constipation.     • metoPROLOL tartrate (LOPRESSOR) 25 MG tablet 12.5 mg by Per G Tube route every 8 hours.     • midodrine (PROAMATINE) 10 MG tablet 10 mg by Per G Tube route as needed (Low blood pressure during hemodialysis (every T-Th-Sat) for SBP<100).     • modafinil (PROVIGIL) 100 MG tablet 100 mg by Per G Tube route daily.     • epoetin alina-epbx (Retacrit) 33612 UNIT/ML injection Inject 10,000 Units into the skin 1 day a week. Administer on Fridays @dialysis     • cholecalciferol (VITAMIN D) 25 mcg (1,000 units) tablet 50 mcg by Per G Tube route daily.     • dextrose (GLUTOSE) 40 % Gel by Per G Tube route as needed for Hypoglycemia.  Instructions on NH papers: Give 37.5 mL via G-tube as needed for hypoglycemia.     • polyethylene glycol (MIRALAX) 17 g packet as directed. Give 1 scoop via G-tube one time a day for stool softener, dissolve in 120-240 mL of water/hold for loose stool     • Ostomy Supplies (Moisture Barrier Skin) Ointment Apply to buttocks and periarea topically every shift for skin protection secondary to incontinence.     • pantoprazole (PROTONIX) 40 MG tablet 40 mg daily. Via G-tube     • budesonide (PULMICORT) 0.5 MG/2ML nebulizer suspension Take 0.5 mg by nebulization in the morning and 0.5 mg in the evening.     • B Complex-C-Folic Acid (Lucille-Martha) Tab 1 tablet by Per G Tube route daily.     • valproic acid (DEPAKENE) 250 MG/5ML solution 10 mLs  in the morning and 10 mLs in the evening. Given via G-tube          ALLERGY  ALLERGIES:   Allergen Reactions   • Ace Inhibitors Other (See Comments)   • Lisinopril Other (See Comments)     unknown   • Valsartan Other (See Comments)                    SOCIAL HISTORY    Social History     Tobacco Use   • Smoking status: Never     Passive exposure: Never (SUSANA)   • Smokeless tobacco: Never   • Tobacco comments:     SUSANA   Vaping Use   • Vaping Use: never used   • Substances: None   • Devices: None   Substance Use Topics   • Alcohol use: Never   • Drug use: Never     Types: Other     Comment: SUSANA       FAMILY HISTORY    No family history on file.    Review of Systems  10 point ROS conducted.  As per HPI, rest of ROS is negative      PHYSICAL EXAMINATION  Vitals:    11/03/23 1209 11/03/23 1239 11/03/23 1309 11/03/23 1409   BP: 117/70 111/67 110/72 121/69   BP Location:       Patient Position:       Pulse: 92 90 88 93   Resp: (!) 22 13 14 17   Temp:       TempSrc:       SpO2:   96% 96%   Weight:         Gen: Pt is in NAD. On the vent.   HEENT: NC/AT. Trach in place  CVS: S1,S2  Lung: Mechanical BS  Abd: +BS. Soft. G tube in place site clean  Extr: LUE AVF. B BKA. Necrosis distal R  index.  Skin: sacral decubitus ulcer noted previously, unable to visualize today  Neuro: Not responsive  Lines: L chest HD cath clean    REVIEW OF LABORATORY, PATHOLOGY, AND RADIOLOGY DATA  • Lab results:  CBC:  Lab Results   Component Value Date    WBC 10.2 11/03/2023    RBC 3.06 (L) 11/03/2023    HGB 9.8 (L) 11/03/2023    HCT 31.6 (L) 11/03/2023    .3 (H) 11/03/2023    MCH 32.0 11/03/2023    MCHC 31.0 (L) 11/03/2023     11/03/2023    AMONO 0.3 06/18/2023    AEO 0.2 06/18/2023    ABASO 0.0 11/03/2023       CMP:  Recent Labs   Lab 11/03/23  0847 11/02/23  0951 11/01/23  0645   SODIUM 132* 133* 134*   POTASSIUM 4.3 3.4 3.5   CHLORIDE 100 97 98   CO2 25 28 27   BUN 41* 66* 45*   CREATININE 2.13* 3.17* 2.38*   GLUCOSE 158* 146* 139*   CALCIUM 9.3 8.5 8.7       Microbiology results:    Microbiology Results     None        •   • Imaging results: Personally reviewed CXR and CT chest    ASSESSMENT   #LLL Pneumonia  - Presented with respiratory distress, coarse breath sounds  - CXR and CT chest with persistent dense LLL consolidation  - WBC 10.2 (recently was 7.7)  - May benefit from longer course of antibiotics for 10-14 days  - Likely also has component of volume overload  #Respiratory distress  #Chronic respiratory failure s/p trach vent dependent  #PVD s/p B BKA   #ESRD on HD  #Large sacral decubitus ulcer  #MDRO, VRE, PSAR and C auris colonization  #Comorbidities: cardiac arrest, anoxic brain injury, PAF, hypothyroidism, HLD, PE on Eliquis, CVA    RECOMMENDATIONS  - Continue empiric avycaz  - Sputum culture  - MRSA nares  - RPP  - F/u bcx    Thank you very much for this consultation.  This assessment and recommendations were discussed with the primary team NP Eden Grider.  We will continue to follow this patient with you and assist in the management in any way that we can.    Caitlyn Lui MD  [phone]     no

## 2024-12-06 ENCOUNTER — TRANSCRIPTION ENCOUNTER (OUTPATIENT)
Age: 34
End: 2024-12-06

## 2024-12-06 VITALS
OXYGEN SATURATION: 98 % | DIASTOLIC BLOOD PRESSURE: 77 MMHG | TEMPERATURE: 98 F | SYSTOLIC BLOOD PRESSURE: 114 MMHG | RESPIRATION RATE: 19 BRPM | HEART RATE: 76 BPM

## 2024-12-06 LAB
ALBUMIN SERPL ELPH-MCNC: 3.1 G/DL — LOW (ref 3.3–5)
ALP SERPL-CCNC: 91 U/L — SIGNIFICANT CHANGE UP (ref 40–120)
ALT FLD-CCNC: 25 U/L — SIGNIFICANT CHANGE UP (ref 12–78)
ANION GAP SERPL CALC-SCNC: 5 MMOL/L — SIGNIFICANT CHANGE UP (ref 5–17)
AST SERPL-CCNC: 12 U/L — LOW (ref 15–37)
BASOPHILS # BLD AUTO: 0.01 K/UL — SIGNIFICANT CHANGE UP (ref 0–0.2)
BASOPHILS NFR BLD AUTO: 0.1 % — SIGNIFICANT CHANGE UP (ref 0–2)
BILIRUB DIRECT SERPL-MCNC: 0.1 MG/DL — SIGNIFICANT CHANGE UP (ref 0–0.3)
BILIRUB INDIRECT FLD-MCNC: 0.5 MG/DL — SIGNIFICANT CHANGE UP (ref 0.2–1)
BILIRUB SERPL-MCNC: 0.6 MG/DL — SIGNIFICANT CHANGE UP (ref 0.2–1.2)
BUN SERPL-MCNC: 8 MG/DL — SIGNIFICANT CHANGE UP (ref 7–23)
CALCIUM SERPL-MCNC: 8.5 MG/DL — SIGNIFICANT CHANGE UP (ref 8.5–10.1)
CHLORIDE SERPL-SCNC: 108 MMOL/L — SIGNIFICANT CHANGE UP (ref 96–108)
CO2 SERPL-SCNC: 27 MMOL/L — SIGNIFICANT CHANGE UP (ref 22–31)
CREAT SERPL-MCNC: 0.8 MG/DL — SIGNIFICANT CHANGE UP (ref 0.5–1.3)
EGFR: 98 ML/MIN/1.73M2 — SIGNIFICANT CHANGE UP
EOSINOPHIL # BLD AUTO: 0 K/UL — SIGNIFICANT CHANGE UP (ref 0–0.5)
EOSINOPHIL NFR BLD AUTO: 0 % — SIGNIFICANT CHANGE UP (ref 0–6)
GLUCOSE SERPL-MCNC: 103 MG/DL — HIGH (ref 70–99)
HCT VFR BLD CALC: 35.9 % — SIGNIFICANT CHANGE UP (ref 34.5–45)
HGB BLD-MCNC: 11.4 G/DL — LOW (ref 11.5–15.5)
IMM GRANULOCYTES NFR BLD AUTO: 1.7 % — HIGH (ref 0–0.9)
LYMPHOCYTES # BLD AUTO: 1.58 K/UL — SIGNIFICANT CHANGE UP (ref 1–3.3)
LYMPHOCYTES # BLD AUTO: 10.6 % — LOW (ref 13–44)
MAGNESIUM SERPL-MCNC: 2 MG/DL — SIGNIFICANT CHANGE UP (ref 1.6–2.6)
MCHC RBC-ENTMCNC: 28.4 PG — SIGNIFICANT CHANGE UP (ref 27–34)
MCHC RBC-ENTMCNC: 31.8 G/DL — LOW (ref 32–36)
MCV RBC AUTO: 89.5 FL — SIGNIFICANT CHANGE UP (ref 80–100)
MONOCYTES # BLD AUTO: 0.9 K/UL — SIGNIFICANT CHANGE UP (ref 0–0.9)
MONOCYTES NFR BLD AUTO: 6 % — SIGNIFICANT CHANGE UP (ref 2–14)
NEUTROPHILS # BLD AUTO: 12.18 K/UL — HIGH (ref 1.8–7.4)
NEUTROPHILS NFR BLD AUTO: 81.6 % — HIGH (ref 43–77)
NRBC # BLD: 0 /100 WBCS — SIGNIFICANT CHANGE UP (ref 0–0)
PHOSPHATE SERPL-MCNC: 2.7 MG/DL — SIGNIFICANT CHANGE UP (ref 2.5–4.5)
PLATELET # BLD AUTO: 316 K/UL — SIGNIFICANT CHANGE UP (ref 150–400)
POTASSIUM SERPL-MCNC: 3.8 MMOL/L — SIGNIFICANT CHANGE UP (ref 3.5–5.3)
POTASSIUM SERPL-SCNC: 3.8 MMOL/L — SIGNIFICANT CHANGE UP (ref 3.5–5.3)
PROT SERPL-MCNC: 6.7 GM/DL — SIGNIFICANT CHANGE UP (ref 6–8.3)
RBC # BLD: 4.01 M/UL — SIGNIFICANT CHANGE UP (ref 3.8–5.2)
RBC # FLD: 13.2 % — SIGNIFICANT CHANGE UP (ref 10.3–14.5)
SODIUM SERPL-SCNC: 140 MMOL/L — SIGNIFICANT CHANGE UP (ref 135–145)
WBC # BLD: 14.93 K/UL — HIGH (ref 3.8–10.5)
WBC # FLD AUTO: 14.93 K/UL — HIGH (ref 3.8–10.5)

## 2024-12-06 RX ORDER — ACETAMINOPHEN 500MG 500 MG/1
2 TABLET, COATED ORAL
Qty: 24 | Refills: 0
Start: 2024-12-06 | End: 2024-12-08

## 2024-12-06 RX ORDER — OXYCODONE HYDROCHLORIDE 30 MG/1
1 TABLET ORAL
Qty: 24 | Refills: 0
Start: 2024-12-06 | End: 2024-12-09

## 2024-12-06 RX ADMIN — OXYCODONE HYDROCHLORIDE 5 MILLIGRAM(S): 30 TABLET ORAL at 01:08

## 2024-12-06 RX ADMIN — OXYCODONE HYDROCHLORIDE 5 MILLIGRAM(S): 30 TABLET ORAL at 06:57

## 2024-12-06 RX ADMIN — Medication 5000 UNIT(S): at 13:19

## 2024-12-06 RX ADMIN — ACETAMINOPHEN 500MG 400 MILLIGRAM(S): 500 TABLET, COATED ORAL at 09:45

## 2024-12-06 RX ADMIN — OXYCODONE HYDROCHLORIDE 5 MILLIGRAM(S): 30 TABLET ORAL at 16:12

## 2024-12-06 RX ADMIN — Medication 140 MILLILITER(S): at 03:14

## 2024-12-06 RX ADMIN — Medication 140 MILLILITER(S): at 09:56

## 2024-12-06 RX ADMIN — ACETAMINOPHEN 500MG 1000 MILLIGRAM(S): 500 TABLET, COATED ORAL at 12:43

## 2024-12-06 RX ADMIN — OXYCODONE HYDROCHLORIDE 5 MILLIGRAM(S): 30 TABLET ORAL at 01:38

## 2024-12-06 RX ADMIN — OXYCODONE HYDROCHLORIDE 5 MILLIGRAM(S): 30 TABLET ORAL at 06:27

## 2024-12-06 RX ADMIN — Medication 5000 UNIT(S): at 06:28

## 2024-12-06 RX ADMIN — OXYCODONE HYDROCHLORIDE 5 MILLIGRAM(S): 30 TABLET ORAL at 15:18

## 2024-12-06 NOTE — DISCHARGE NOTE PROVIDER - CARE PROVIDER_API CALL
Rodney Farley  Surgery  [address]  [place] [address]  Phone: [phone]  Fax: [phone]  Follow Up Time: 2 weeks

## 2024-12-06 NOTE — DISCHARGE NOTE PROVIDER - HOSPITAL COURSE
Patient was admitted to [facility] for acute cholecystitis. Patient underwent robotic assisted cholecystectomy.  the pt tolerated the procedure well, and afterwards was admitted to the surgical floor for observation and pain management. The remainder of the hospital stay was uneventful and patient was stable for discharge

## 2024-12-06 NOTE — DISCHARGE NOTE NURSING/CASE MANAGEMENT/SOCIAL WORK - PATIENT PORTAL LINK FT
You can access the FollowMyHealth Patient Portal offered by [facility] by registering at the following website: [email or web address]. By joining [facility]’s FollowMyHealth portal, you will also be able to view your health information using other applications (apps) compatible with our system.

## 2024-12-06 NOTE — PROGRESS NOTE ADULT - ASSESSMENT
Pt is POD1 after robot assisted cholecystectomy  tolerated well  regular low fat diet  pain control PRN  plan for dc in AM

## 2024-12-06 NOTE — DISCHARGE NOTE NURSING/CASE MANAGEMENT/SOCIAL WORK - FINANCIAL ASSISTANCE
[facility] provides services at a reduced cost to those who are determined to be eligible through [facility]’s financial assistance program. Information regarding [facility]’s financial assistance program can be found by going to [email or web address] or by calling [phone].

## 2024-12-06 NOTE — DISCHARGE NOTE PROVIDER - NSDCFUSCHEDAPPT_GEN_ALL_CORE_FT
Dontrell Mccullough  [facility]  [facility] [address]   Scheduled Appointment: 12/09/2024

## 2024-12-06 NOTE — DISCHARGE NOTE NURSING/CASE MANAGEMENT/SOCIAL WORK - NSDCPEFALRISK_GEN_ALL_CORE
For information on Fall & Injury Prevention, visit: [email or web address] OR  [email or web address] OR  https://www.cdc.gov/steadi/patient.html

## 2024-12-06 NOTE — DISCHARGE NOTE PROVIDER - NSDCMRMEDTOKEN_GEN_ALL_CORE_FT
acetaminophen 325 mg oral tablet: 3 tab(s) orally every 6 hours  ibuprofen 600 mg oral tablet: 1 tab(s) orally every 6 hours  oxyCODONE 5 mg oral tablet: 1 tab(s) orally every 4 hours as needed for  severe pain MDD: 6  Tylenol 325 mg oral tablet: 2 tab(s) orally every 6 hours

## 2024-12-06 NOTE — PROGRESS NOTE ADULT - SUBJECTIVE AND OBJECTIVE BOX
Pre-operative Note    - Pre-operative Diagnosis: acute cholecystitis    - Procedure: robotic-assisted laparoscopic cholecystectomy     - Surgeon: Dr. Farley     - Labs:                        12.3   13.11 )-----------( 332      ( 04 Dec 2024 20:38 )             38.6     12-04    140  |  107  |  10  ----------------------------<  98  3.5   |  28  |  0.84    Ca    9.2      04 Dec 2024 20:38    TPro  7.7  /  Alb  3.8  /  TBili  0.5  /  DBili  x   /  AST  14[L]  /  ALT  27  /  AlkPhos  108  12-04          Orders:     [X] Type & Screen  [X] CBC  [X] BMP  [X] PT/PTT/INR  [X] Urinalysis  [X] EKG  [X] NPO/IVF  [X] Urine pregnancy    > Blood: Not needed.     - Permits:  > Consent in chart.  > Case scheduled with Nursing Supervisor Jacquelin.     
post op check  pt is s/p robot assisted lap shruti  Pt seen and examined at bedside. Pain is well controlled on pain medication. Pt denies complaints. Pt tolerating diet. Pt denies nausea and vomiting.        Vital Signs Last 24 Hrs  T(C): 36.9 (05 Dec 2024 23:55), Max: 36.9 (05 Dec 2024 19:55)  T(F): 98.5 (05 Dec 2024 23:55), Max: 98.5 (05 Dec 2024 19:55)  HR: 94 (05 Dec 2024 23:55) (66 - 104)  BP: 109/75 (05 Dec 2024 23:55) (99/65 - 141/79)  BP(mean): --  RR: 18 (05 Dec 2024 23:55) (10 - 25)  SpO2: 99% (05 Dec 2024 23:55) (93% - 100%)    Parameters below as of 05 Dec 2024 23:55  Patient On (Oxygen Delivery Method): room air          PHYSICAL EXAM:    GENERAL: NAD  HEAD:  Atraumatic, Normocephalic  CHEST/LUNG: breathing normally  HEART: RRR  ABDOMEN: non distended, soft, appropriately tender  EXTREMITIES:  calf soft, non tender b/l

## 2024-12-06 NOTE — CHART NOTE - NSCHARTNOTEFT_GEN_A_CORE
Date: 12/06/24    [place] [address]      To Whom It May Concern,     Ms. Mccall Ronn was admitted on 12/05/2024, treated and discharged on 12/06/24 from [facility], she can return to work after outpatient office visit.    If any questions or concerns please call.     Thanks     Kelsey Alvares  [phone]  Beeper #

## 2024-12-09 ENCOUNTER — APPOINTMENT (OUTPATIENT)
Dept: INTERNAL MEDICINE | Facility: CLINIC | Age: 34
End: 2024-12-09

## 2024-12-09 LAB — SURGICAL PATHOLOGY STUDY: SIGNIFICANT CHANGE UP

## 2024-12-12 DIAGNOSIS — K80.00 CALCULUS OF GALLBLADDER WITH ACUTE CHOLECYSTITIS WITHOUT OBSTRUCTION: ICD-10-CM

## 2024-12-18 ENCOUNTER — NON-APPOINTMENT (OUTPATIENT)
Age: 34
End: 2024-12-18

## 2024-12-18 ENCOUNTER — APPOINTMENT (OUTPATIENT)
Dept: SURGERY | Facility: CLINIC | Age: 34
End: 2024-12-18
Payer: COMMERCIAL

## 2024-12-18 VITALS
SYSTOLIC BLOOD PRESSURE: 108 MMHG | HEIGHT: 64 IN | WEIGHT: 220 LBS | DIASTOLIC BLOOD PRESSURE: 71 MMHG | HEART RATE: 84 BPM | BODY MASS INDEX: 37.56 KG/M2 | TEMPERATURE: 98.4 F

## 2024-12-18 PROCEDURE — 99024 POSTOP FOLLOW-UP VISIT: CPT

## 2024-12-18 NOTE — OB PROVIDER LABOR PROGRESS NOTE - NS_EFFACEMANT_OBGYN_ALL_OB_NU
When patient was in you told them you would order and office notes also state one was ordered but nothing was sent into pharmacy.  If ok please approve   
80
80

## 2024-12-24 ENCOUNTER — APPOINTMENT (OUTPATIENT)
Dept: INTERNAL MEDICINE | Facility: CLINIC | Age: 34
End: 2024-12-24
Payer: COMMERCIAL

## 2024-12-24 VITALS
OXYGEN SATURATION: 98 % | HEART RATE: 78 BPM | SYSTOLIC BLOOD PRESSURE: 159 MMHG | WEIGHT: 220 LBS | TEMPERATURE: 98 F | HEIGHT: 64 IN | BODY MASS INDEX: 37.56 KG/M2 | DIASTOLIC BLOOD PRESSURE: 76 MMHG

## 2024-12-24 VITALS — SYSTOLIC BLOOD PRESSURE: 100 MMHG | DIASTOLIC BLOOD PRESSURE: 64 MMHG

## 2024-12-24 DIAGNOSIS — Z00.00 ENCOUNTER FOR GENERAL ADULT MEDICAL EXAMINATION W/OUT ABNORMAL FINDINGS: ICD-10-CM

## 2024-12-24 DIAGNOSIS — D64.9 ANEMIA, UNSPECIFIED: ICD-10-CM

## 2024-12-24 DIAGNOSIS — E66.9 OBESITY, UNSPECIFIED: ICD-10-CM

## 2024-12-24 PROCEDURE — 99385 PREV VISIT NEW AGE 18-39: CPT

## 2024-12-24 PROCEDURE — 36415 COLL VENOUS BLD VENIPUNCTURE: CPT

## 2024-12-24 RX ORDER — OXYCODONE AND ACETAMINOPHEN TABLETS 10; 300 MG/1; MG/1
TABLET ORAL
Refills: 0 | Status: ACTIVE | COMMUNITY

## 2024-12-26 ENCOUNTER — NON-APPOINTMENT (OUTPATIENT)
Age: 34
End: 2024-12-26

## 2024-12-26 LAB
ALBUMIN SERPL ELPH-MCNC: 4.1 G/DL
ALP BLD-CCNC: 109 U/L
ALT SERPL-CCNC: 15 U/L
ANION GAP SERPL CALC-SCNC: 11 MMOL/L
AST SERPL-CCNC: 13 U/L
BILIRUB SERPL-MCNC: 0.8 MG/DL
BUN SERPL-MCNC: 10 MG/DL
CALCIUM SERPL-MCNC: 9.3 MG/DL
CHLORIDE SERPL-SCNC: 106 MMOL/L
CHOLEST SERPL-MCNC: 148 MG/DL
CO2 SERPL-SCNC: 24 MMOL/L
CREAT SERPL-MCNC: 0.97 MG/DL
EGFR: 78 ML/MIN/1.73M2
ESTIMATED AVERAGE GLUCOSE: 105 MG/DL
FERRITIN SERPL-MCNC: 36 NG/ML
FOLATE SERPL-MCNC: 12.5 NG/ML
GLUCOSE SERPL-MCNC: 89 MG/DL
HBA1C MFR BLD HPLC: 5.3 %
HCT VFR BLD CALC: 39.3 %
HDLC SERPL-MCNC: 53 MG/DL
HGB BLD-MCNC: 12.4 G/DL
IRON SATN MFR SERPL: 19 %
IRON SERPL-MCNC: 62 UG/DL
LDLC SERPL CALC-MCNC: 83 MG/DL
MCHC RBC-ENTMCNC: 29 PG
MCHC RBC-ENTMCNC: 31.6 G/DL
MCV RBC AUTO: 92 FL
NONHDLC SERPL-MCNC: 95 MG/DL
PLATELET # BLD AUTO: 292 K/UL
POTASSIUM SERPL-SCNC: 4.2 MMOL/L
PROT SERPL-MCNC: 6.7 G/DL
RBC # BLD: 4.27 M/UL
RBC # FLD: 13.3 %
SODIUM SERPL-SCNC: 141 MMOL/L
TIBC SERPL-MCNC: 326 UG/DL
TRIGL SERPL-MCNC: 60 MG/DL
TSH SERPL-ACNC: 0.59 UIU/ML
UIBC SERPL-MCNC: 264 UG/DL
VIT B12 SERPL-MCNC: 456 PG/ML
WBC # FLD AUTO: 7.4 K/UL

## 2025-02-04 NOTE — ED ADULT NURSE NOTE - CINV DISCH TEACH PARTICIP
Population Health: Outreach by Ambulatory Pharmacy Team    Patient: Denia Tong  Primary Care Provider (PCP): Funmi Lawson, APRN-CNP  Payor: Lety JUAREZ  Reason: Adherence  Medication(s): losartan 50 mg  Outcome: Left Voicemail    Kristen Moseley, PharmD  Stefanie rodriguez     Patient

## 2025-02-10 ENCOUNTER — APPOINTMENT (OUTPATIENT)
Dept: INTERNAL MEDICINE | Facility: CLINIC | Age: 35
End: 2025-02-10

## 2025-06-30 ENCOUNTER — NON-APPOINTMENT (OUTPATIENT)
Age: 35
End: 2025-06-30

## 2025-07-02 ENCOUNTER — APPOINTMENT (OUTPATIENT)
Dept: INTERNAL MEDICINE | Facility: CLINIC | Age: 35
End: 2025-07-02
Payer: COMMERCIAL

## 2025-07-02 VITALS
TEMPERATURE: 98 F | SYSTOLIC BLOOD PRESSURE: 112 MMHG | BODY MASS INDEX: 37.39 KG/M2 | WEIGHT: 219 LBS | HEIGHT: 64 IN | DIASTOLIC BLOOD PRESSURE: 77 MMHG | OXYGEN SATURATION: 98 % | HEART RATE: 78 BPM

## 2025-07-02 PROCEDURE — 99213 OFFICE O/P EST LOW 20 MIN: CPT

## 2025-07-29 ENCOUNTER — APPOINTMENT (OUTPATIENT)
Dept: INTERNAL MEDICINE | Facility: CLINIC | Age: 35
End: 2025-07-29

## 2025-08-25 ENCOUNTER — APPOINTMENT (OUTPATIENT)
Dept: INTERNAL MEDICINE | Facility: CLINIC | Age: 35
End: 2025-08-25

## 2025-09-03 ENCOUNTER — APPOINTMENT (OUTPATIENT)
Dept: INTERNAL MEDICINE | Facility: CLINIC | Age: 35
End: 2025-09-03
Payer: COMMERCIAL

## 2025-09-03 VITALS
HEART RATE: 75 BPM | SYSTOLIC BLOOD PRESSURE: 118 MMHG | BODY MASS INDEX: 37.39 KG/M2 | TEMPERATURE: 98 F | OXYGEN SATURATION: 98 % | HEIGHT: 64 IN | DIASTOLIC BLOOD PRESSURE: 70 MMHG | WEIGHT: 219 LBS

## 2025-09-03 DIAGNOSIS — E66.9 OBESITY, UNSPECIFIED: ICD-10-CM

## 2025-09-03 LAB
ALBUMIN SERPL ELPH-MCNC: 4 G/DL
ALP BLD-CCNC: 93 U/L
ALT SERPL-CCNC: 18 U/L
ANION GAP SERPL CALC-SCNC: 10 MMOL/L
AST SERPL-CCNC: 23 U/L
BILIRUB SERPL-MCNC: 0.4 MG/DL
BUN SERPL-MCNC: 8 MG/DL
CALCIUM SERPL-MCNC: 9.6 MG/DL
CHLORIDE SERPL-SCNC: 104 MMOL/L
CO2 SERPL-SCNC: 25 MMOL/L
CREAT SERPL-MCNC: 0.89 MG/DL
EGFRCR SERPLBLD CKD-EPI 2021: 86 ML/MIN/1.73M2
ESTIMATED AVERAGE GLUCOSE: 103 MG/DL
GLUCOSE SERPL-MCNC: 93 MG/DL
HBA1C MFR BLD HPLC: 5.2 %
HCT VFR BLD CALC: 36.4 %
HGB BLD-MCNC: 11.8 G/DL
MCHC RBC-ENTMCNC: 29.1 PG
MCHC RBC-ENTMCNC: 32.4 G/DL
MCV RBC AUTO: 89.9 FL
PLATELET # BLD AUTO: 280 K/UL
POTASSIUM SERPL-SCNC: 4.5 MMOL/L
PROT SERPL-MCNC: 6.6 G/DL
RBC # BLD: 4.05 M/UL
RBC # FLD: 12.9 %
SODIUM SERPL-SCNC: 139 MMOL/L
WBC # FLD AUTO: 8.58 K/UL

## 2025-09-03 PROCEDURE — 99214 OFFICE O/P EST MOD 30 MIN: CPT

## 2025-09-04 ENCOUNTER — NON-APPOINTMENT (OUTPATIENT)
Age: 35
End: 2025-09-04

## 2025-09-07 RX ORDER — TIRZEPATIDE 2.5 MG/.5ML
2.5 INJECTION, SOLUTION SUBCUTANEOUS
Qty: 4 | Refills: 3 | Status: ACTIVE | COMMUNITY
Start: 2025-09-03

## 2025-09-08 ENCOUNTER — APPOINTMENT (OUTPATIENT)
Dept: SURGERY | Facility: CLINIC | Age: 35
End: 2025-09-08
Payer: COMMERCIAL

## 2025-09-08 VITALS
TEMPERATURE: 97.9 F | WEIGHT: 219 LBS | RESPIRATION RATE: 16 BRPM | HEIGHT: 64 IN | DIASTOLIC BLOOD PRESSURE: 79 MMHG | OXYGEN SATURATION: 100 % | SYSTOLIC BLOOD PRESSURE: 112 MMHG | BODY MASS INDEX: 37.39 KG/M2 | HEART RATE: 69 BPM

## 2025-09-08 PROCEDURE — 99213 OFFICE O/P EST LOW 20 MIN: CPT

## (undated) DEVICE — ELCTR STRYKER NEPTUNE SMOKE EVACUATION PENCIL (GREEN)

## (undated) DEVICE — GLV 7 PROTEXIS (WHITE)

## (undated) DEVICE — XI ARM PERMANENT CAUTERY HOOK

## (undated) DEVICE — D HELP - CLEARVIEW CLEARIFY SYSTEM

## (undated) DEVICE — DRSG ALLEVYN GENTLE BORDER 3X3"

## (undated) DEVICE — DRAPE 3/4 SHEET W REINFORCEMENT 56X77"

## (undated) DEVICE — SUT VICRYL 0 27" UR-6

## (undated) DEVICE — XI TIP COVER

## (undated) DEVICE — XI ARM GRASPER TIP UP FENESTRATED

## (undated) DEVICE — XI OBTURATOR OPTICAL BLADELESS 8MM

## (undated) DEVICE — XI ARM CLIP APPLIER MEDIUM-LARGE

## (undated) DEVICE — STRYKER PINPOINT CONTRAST ICG KIT

## (undated) DEVICE — TUBING STRYKER PNEUMOCLEAR SMOKE EVACUATION HIGH FLOW

## (undated) DEVICE — DRSG STERISTRIPS 0.5 X 4"

## (undated) DEVICE — XI SEAL UNIVERSIAL 5-12MM

## (undated) DEVICE — SUT MONOCRYL 4-0 27" PS-2 UNDYED

## (undated) DEVICE — Device

## (undated) DEVICE — XI ARM SCISSOR MONO CURVED

## (undated) DEVICE — XI ARM FORCEP FENESTRATED BIPOLAR 8MM

## (undated) DEVICE — SHEARS COVIDIEN ENDO SHEAR 5MM X 31CM W UNIPOLAR CAUTERY

## (undated) DEVICE — DRSG 2X2

## (undated) DEVICE — XI DRAPE ARM

## (undated) DEVICE — XI ARM FORCEP CADIERE 8MM

## (undated) DEVICE — XI ARM SCISSOR ROUND TIP 8MM

## (undated) DEVICE — XI ARM FORCEP PROGRASP 8MM

## (undated) DEVICE — GLV 7 PROTEXIS (BLUE)

## (undated) DEVICE — XI ENDOWRIST SUCTION IRRIGATOR 8MM

## (undated) DEVICE — TUBING STRYKEFLOW II SUCTION / IRRIGATOR

## (undated) DEVICE — SUT PDS PLUS 0 27" CT-2

## (undated) DEVICE — XI DRAPE COLUMN

## (undated) DEVICE — DRAPE SPLIT SHEET 77" X 120"

## (undated) DEVICE — BLADE SURGICAL #15 CARBON

## (undated) DEVICE — NDL HYPO SAFE 25G X 1.5" (ORANGE)

## (undated) DEVICE — ENDOCATCH 10MM

## (undated) DEVICE — PACK GENERAL LAPAROSCOPY